# Patient Record
Sex: FEMALE | Race: WHITE | NOT HISPANIC OR LATINO | Employment: FULL TIME | URBAN - METROPOLITAN AREA
[De-identification: names, ages, dates, MRNs, and addresses within clinical notes are randomized per-mention and may not be internally consistent; named-entity substitution may affect disease eponyms.]

---

## 2022-09-15 ENCOUNTER — TELEPHONE (OUTPATIENT)
Dept: FAMILY MEDICINE CLINIC | Facility: CLINIC | Age: 57
End: 2022-09-15

## 2022-09-15 ENCOUNTER — OFFICE VISIT (OUTPATIENT)
Dept: FAMILY MEDICINE CLINIC | Facility: CLINIC | Age: 57
End: 2022-09-15
Payer: COMMERCIAL

## 2022-09-15 VITALS
WEIGHT: 154 LBS | OXYGEN SATURATION: 96 % | HEART RATE: 92 BPM | TEMPERATURE: 98.3 F | BODY MASS INDEX: 30.23 KG/M2 | DIASTOLIC BLOOD PRESSURE: 72 MMHG | SYSTOLIC BLOOD PRESSURE: 112 MMHG | RESPIRATION RATE: 16 BRPM | HEIGHT: 60 IN

## 2022-09-15 DIAGNOSIS — Z13.6 SCREENING FOR HYPERTENSION: ICD-10-CM

## 2022-09-15 DIAGNOSIS — Z13.220 SCREENING FOR HYPERLIPIDEMIA: ICD-10-CM

## 2022-09-15 DIAGNOSIS — Z13.29 SCREENING FOR HYPOTHYROIDISM: ICD-10-CM

## 2022-09-15 DIAGNOSIS — Z11.4 SCREENING FOR HIV (HUMAN IMMUNODEFICIENCY VIRUS): ICD-10-CM

## 2022-09-15 DIAGNOSIS — Z76.89 ESTABLISHING CARE WITH NEW DOCTOR, ENCOUNTER FOR: Primary | ICD-10-CM

## 2022-09-15 DIAGNOSIS — R05.9 COUGH: Primary | ICD-10-CM

## 2022-09-15 DIAGNOSIS — R05.9 COUGH: ICD-10-CM

## 2022-09-15 DIAGNOSIS — Z11.59 ENCOUNTER FOR HEPATITIS C SCREENING TEST FOR LOW RISK PATIENT: ICD-10-CM

## 2022-09-15 PROCEDURE — 3725F SCREEN DEPRESSION PERFORMED: CPT | Performed by: FAMILY MEDICINE

## 2022-09-15 PROCEDURE — 99204 OFFICE O/P NEW MOD 45 MIN: CPT | Performed by: FAMILY MEDICINE

## 2022-09-15 RX ORDER — ROSUVASTATIN CALCIUM 20 MG/1
20 TABLET, COATED ORAL DAILY
COMMUNITY

## 2022-09-15 RX ORDER — DOXYCYCLINE HYCLATE 100 MG
100 TABLET ORAL 2 TIMES DAILY
Qty: 28 TABLET | Refills: 0 | Status: SHIPPED | OUTPATIENT
Start: 2022-09-15 | End: 2022-09-29

## 2022-09-15 RX ORDER — FLUCONAZOLE 150 MG/1
TABLET ORAL
Qty: 2 TABLET | Refills: 3 | Status: SHIPPED | OUTPATIENT
Start: 2022-09-15 | End: 2022-09-18

## 2022-09-15 RX ORDER — BENZONATATE 200 MG/1
200 CAPSULE ORAL 3 TIMES DAILY PRN
Qty: 20 CAPSULE | Refills: 0 | Status: SHIPPED | OUTPATIENT
Start: 2022-09-15 | End: 2022-09-23 | Stop reason: SDUPTHER

## 2022-09-16 LAB
ALBUMIN SERPL-MCNC: 4.8 G/DL (ref 3.6–5.1)
ALBUMIN/GLOB SERPL: 1.7 (CALC) (ref 1–2.5)
ALP SERPL-CCNC: 76 U/L (ref 37–153)
ALT SERPL-CCNC: 20 U/L (ref 6–29)
AST SERPL-CCNC: 17 U/L (ref 10–35)
BASOPHILS # BLD AUTO: 67 CELLS/UL (ref 0–200)
BASOPHILS NFR BLD AUTO: 0.7 %
BILIRUB SERPL-MCNC: 0.7 MG/DL (ref 0.2–1.2)
BUN SERPL-MCNC: 19 MG/DL (ref 7–25)
BUN/CREAT SERPL: NORMAL (CALC) (ref 6–22)
CALCIUM SERPL-MCNC: 10.4 MG/DL (ref 8.6–10.4)
CHLORIDE SERPL-SCNC: 100 MMOL/L (ref 98–110)
CHOLEST SERPL-MCNC: 238 MG/DL
CHOLEST/HDLC SERPL: 4.7 (CALC)
CO2 SERPL-SCNC: 28 MMOL/L (ref 20–32)
CREAT SERPL-MCNC: 0.71 MG/DL (ref 0.5–1.03)
EOSINOPHIL # BLD AUTO: 228 CELLS/UL (ref 15–500)
EOSINOPHIL NFR BLD AUTO: 2.4 %
ERYTHROCYTE [DISTWIDTH] IN BLOOD BY AUTOMATED COUNT: 12.8 % (ref 11–15)
GFR/BSA.PRED SERPLBLD CYS-BASED-ARV: 100 ML/MIN/1.73M2
GLOBULIN SER CALC-MCNC: 2.8 G/DL (CALC) (ref 1.9–3.7)
GLUCOSE SERPL-MCNC: 86 MG/DL (ref 65–99)
HCT VFR BLD AUTO: 42.4 % (ref 35–45)
HCV AB S/CO SERPL IA: 0.04
HCV AB SERPL QL IA: NORMAL
HDLC SERPL-MCNC: 51 MG/DL
HGB BLD-MCNC: 14 G/DL (ref 11.7–15.5)
HIV 1+2 AB+HIV1 P24 AG SERPL QL IA: NORMAL
LDLC SERPL CALC-MCNC: 159 MG/DL (CALC)
LYMPHOCYTES # BLD AUTO: 2309 CELLS/UL (ref 850–3900)
LYMPHOCYTES NFR BLD AUTO: 24.3 %
MCH RBC QN AUTO: 30.1 PG (ref 27–33)
MCHC RBC AUTO-ENTMCNC: 33 G/DL (ref 32–36)
MCV RBC AUTO: 91.2 FL (ref 80–100)
MONOCYTES # BLD AUTO: 561 CELLS/UL (ref 200–950)
MONOCYTES NFR BLD AUTO: 5.9 %
NEUTROPHILS # BLD AUTO: 6337 CELLS/UL (ref 1500–7800)
NEUTROPHILS NFR BLD AUTO: 66.7 %
NONHDLC SERPL-MCNC: 187 MG/DL (CALC)
PLATELET # BLD AUTO: 400 THOUSAND/UL (ref 140–400)
PMV BLD REES-ECKER: 10.8 FL (ref 7.5–12.5)
POTASSIUM SERPL-SCNC: 4.1 MMOL/L (ref 3.5–5.3)
PROT SERPL-MCNC: 7.6 G/DL (ref 6.1–8.1)
RBC # BLD AUTO: 4.65 MILLION/UL (ref 3.8–5.1)
SODIUM SERPL-SCNC: 139 MMOL/L (ref 135–146)
TRIGL SERPL-MCNC: 146 MG/DL
TSH SERPL-ACNC: 0.67 MIU/L
WBC # BLD AUTO: 9.5 THOUSAND/UL (ref 3.8–10.8)

## 2022-09-18 NOTE — PROGRESS NOTES
Name: Samara Dimas      : 1965      MRN: [id number]  Encounter Provider: Zbigniew Joshua MD  Encounter Date: 9/15/2022   Encounter department: [address] [place] [address]     1  Establishing care with new doctor, encounter for  -     CBC and differential  -     Comprehensive metabolic panel  -     TSH, 3rd generation  -     Lipid panel  -     Hepatitis C Ab W/Refl To HCV RNA, Qn, PCR  -     Human Immunodeficiency Virus 1/2 Antigen / Antibody ( Fourth Generation) with Reflex Testing    2  Screening for hypertension  -     Comprehensive metabolic panel    3  Screening for hyperlipidemia  -     Lipid panel    4  Screening for hypothyroidism  -     TSH, 3rd generation    5  Encounter for hepatitis C screening test for low risk patient  -     Hepatitis C Ab W/Refl To HCV RNA, Qn, PCR    6  Screening for HIV (human immunodeficiency virus)  -     Human Immunodeficiency Virus 1/2 Antigen / Antibody ( Fourth Generation) with Reflex Testing    7  Cough  -     doxycycline hyclate (VIBRA-TABS) 100 mg tablet; Take 1 tablet (100 mg total) by mouth 2 (two) times a day for 14 days  -     benzonatate (TESSALON) 200 MG capsule; Take 1 capsule (200 mg total) by mouth 3 (three) times a day as needed for cough    BMI Counseling: Body mass index is 30 58 kg/m²  The BMI is above normal  Nutrition recommendations include encouraging healthy choices of fruits and vegetables and moderation in carbohydrate intake  Exercise recommendations include exercising 3-5 times per week  No pharmacotherapy was ordered  Rationale for BMI follow-up plan is due to patient being overweight or obese  Depression Screening and Follow-up Plan: Patient was screened for depression during today's encounter  They screened negative with a PHQ-2 score of 0          Subjective      PATIENT IS A NEW PATIENT TO THE PRACTICE    REVIEWED PAST MEDICAL HISTORY  DISCUSSED PRACTICE POLICY    RECOMMEND BW AND CPX IN THE FUTURE    Review of Systems   Constitutional: Negative for chills, fatigue and fever  HENT: Negative for congestion, ear discharge, ear pain, mouth sores, postnasal drip, sore throat and trouble swallowing  Eyes: Negative for pain, discharge and visual disturbance  Respiratory: Negative for cough, shortness of breath and wheezing  Cardiovascular: Negative for chest pain, palpitations and leg swelling  Gastrointestinal: Negative for abdominal distention, abdominal pain, blood in stool, diarrhea and nausea  Endocrine: Negative for polydipsia, polyphagia and polyuria  Genitourinary: Negative for dysuria, frequency, hematuria and urgency  Musculoskeletal: Negative for arthralgias, gait problem and joint swelling  Skin: Negative for pallor and rash  Neurological: Negative for dizziness, syncope, speech difficulty, weakness, light-headedness, numbness and headaches  Hematological: Negative for adenopathy  Psychiatric/Behavioral: Negative for behavioral problems, confusion and sleep disturbance  The patient is not nervous/anxious  Current Outpatient Medications on File Prior to Visit   Medication Sig    rosuvastatin (CRESTOR) 20 MG tablet Take 20 mg by mouth daily       Objective     /72 (BP Location: Right arm, Patient Position: Sitting, Cuff Size: Adult)   Pulse 92   Temp 98 3 °F (36 8 °C) (Temporal)   Resp 16   Ht 4' 11 5" (1 511 m)   Wt 69 9 kg (154 lb)   SpO2 96%   BMI 30 58 kg/m²     Physical Exam  Constitutional:       Appearance: She is well-developed  HENT:      Head: Normocephalic and atraumatic  Eyes:      General:         Right eye: No discharge  Left eye: No discharge  Conjunctiva/sclera: Conjunctivae normal       Pupils: Pupils are equal, round, and reactive to light  Neck:      Thyroid: No thyromegaly  Cardiovascular:      Rate and Rhythm: Normal rate and regular rhythm  Heart sounds: Normal heart sounds  No murmur heard    Pulmonary: Effort: Pulmonary effort is normal  No respiratory distress  Breath sounds: Normal breath sounds  No wheezing or rales  Abdominal:      General: Bowel sounds are normal       Palpations: Abdomen is soft  Tenderness: There is no abdominal tenderness  Musculoskeletal:         General: No tenderness  Normal range of motion  Cervical back: Normal range of motion and neck supple  Lymphadenopathy:      Cervical: No cervical adenopathy  Skin:     General: Skin is warm and dry  Findings: No erythema or rash  Neurological:      Mental Status: She is alert and oriented to person, place, and time  Psychiatric:         Behavior: Behavior normal          Thought Content:  Thought content normal          Judgment: Judgment normal        Melania Sow MD

## 2022-09-19 ENCOUNTER — TELEPHONE (OUTPATIENT)
Dept: FAMILY MEDICINE CLINIC | Facility: CLINIC | Age: 57
End: 2022-09-19

## 2022-09-19 NOTE — TELEPHONE ENCOUNTER
When I spoke to the pt about her b/w results she asked about the PT order discussed at her initial ov  Please advise

## 2022-09-20 ENCOUNTER — TELEPHONE (OUTPATIENT)
Dept: FAMILY MEDICINE CLINIC | Facility: CLINIC | Age: 57
End: 2022-09-20

## 2022-09-20 NOTE — TELEPHONE ENCOUNTER
[facility] states Sofiya was in last week for a cough  She still has the cough and not any better  Sofiya has 9 or 10 doses left of the antibiotic and still has meds for the cough    [phone]

## 2022-09-22 NOTE — TELEPHONE ENCOUNTER
Zack states Sofiya's cough is not improving  Still has dry coughing fits that exhauste her  Syeda has 1 more week of her antibiotic  Zack asked if maybe an inhaler would help?   [employer]  [phone]

## 2022-09-23 ENCOUNTER — OFFICE VISIT (OUTPATIENT)
Dept: FAMILY MEDICINE CLINIC | Facility: CLINIC | Age: 57
End: 2022-09-23
Payer: COMMERCIAL

## 2022-09-23 VITALS
BODY MASS INDEX: 30.43 KG/M2 | HEIGHT: 60 IN | OXYGEN SATURATION: 97 % | DIASTOLIC BLOOD PRESSURE: 70 MMHG | RESPIRATION RATE: 12 BRPM | HEART RATE: 83 BPM | TEMPERATURE: 98.2 F | WEIGHT: 155 LBS | SYSTOLIC BLOOD PRESSURE: 108 MMHG

## 2022-09-23 DIAGNOSIS — J98.01 BRONCHOSPASM: ICD-10-CM

## 2022-09-23 DIAGNOSIS — M79.673 PAIN OF FOOT, UNSPECIFIED LATERALITY: ICD-10-CM

## 2022-09-23 DIAGNOSIS — R05.9 COUGH: Primary | ICD-10-CM

## 2022-09-23 DIAGNOSIS — J40 BRONCHITIS: ICD-10-CM

## 2022-09-23 DIAGNOSIS — M54.2 NECK PAIN: ICD-10-CM

## 2022-09-23 PROCEDURE — 99213 OFFICE O/P EST LOW 20 MIN: CPT | Performed by: FAMILY MEDICINE

## 2022-09-23 RX ORDER — BENZONATATE 200 MG/1
200 CAPSULE ORAL 3 TIMES DAILY PRN
Qty: 20 CAPSULE | Refills: 0 | Status: SHIPPED | OUTPATIENT
Start: 2022-09-23

## 2022-09-23 RX ORDER — PREDNISONE 20 MG/1
TABLET ORAL
Qty: 14 TABLET | Refills: 0 | Status: SHIPPED | OUTPATIENT
Start: 2022-09-23

## 2022-09-23 RX ORDER — ALBUTEROL SULFATE 90 UG/1
2 AEROSOL, METERED RESPIRATORY (INHALATION) EVERY 6 HOURS PRN
Qty: 18 G | Refills: 5 | Status: SHIPPED | OUTPATIENT
Start: 2022-09-23

## 2022-09-23 NOTE — PROGRESS NOTES
Name: Jimbo Alejandre      : 1965      MRN: [id number]  Encounter Provider: Rosalva Ramírez MD  Encounter Date: 2022   Encounter department: [address] [facility]     1  Cough  -     predniSONE 20 mg tablet; 2 PO QD X 4 DAYS, THEN 1 PO QD X 4 DAYS, THEN 1/2 PO QD X 4 DAYS  -     albuterol (Ventolin HFA) 90 mcg/act inhaler; Inhale 2 puffs every 6 (six) hours as needed for wheezing  -     benzonatate (TESSALON) 200 MG capsule; Take 1 capsule (200 mg total) by mouth 3 (three) times a day as needed for cough    2  Bronchitis    3  Bronchospasm         Subjective      FOLLOW UP    CONTINUES TO COUGH  SL CONGESTION  STARTING TO FEEL A LITTLE BETTER  NO FEVER  COUGHING A LOT AT NIGHT    NO NVD      Review of Systems   Constitutional: Negative for chills, fatigue and fever  HENT: Positive for congestion and postnasal drip  Negative for ear discharge, ear pain, sinus pressure, sinus pain, sneezing, sore throat and trouble swallowing  Eyes: Negative for discharge  Respiratory: Positive for cough  Negative for chest tightness, shortness of breath and wheezing  Cardiovascular: Negative for chest pain  Gastrointestinal: Negative for abdominal pain, diarrhea, nausea and vomiting  Genitourinary: Negative for dysuria  Musculoskeletal: Negative for arthralgias, gait problem, myalgias and neck stiffness  Skin: Negative for rash  Neurological: Negative for headaches  Hematological: Negative for adenopathy  Psychiatric/Behavioral: The patient is not nervous/anxious          Current Outpatient Medications on File Prior to Visit   Medication Sig    doxycycline hyclate (VIBRA-TABS) 100 mg tablet Take 1 tablet (100 mg total) by mouth 2 (two) times a day for 14 days    rosuvastatin (CRESTOR) 20 MG tablet Take 20 mg by mouth daily    [DISCONTINUED] benzonatate (TESSALON) 200 MG capsule Take 1 capsule (200 mg total) by mouth 3 (three) times a day as needed for cough       Objective     /70   Pulse 83   Temp 98 2 °F (36 8 °C) (Temporal)   Resp 12   Ht 4' 11 5" (1 511 m)   Wt 70 3 kg (155 lb)   SpO2 97%   BMI 30 78 kg/m²     Physical Exam  Constitutional:       Appearance: She is well-developed  HENT:      Head: Normocephalic and atraumatic  Eyes:      General:         Right eye: No discharge  Left eye: No discharge  Conjunctiva/sclera: Conjunctivae normal       Pupils: Pupils are equal, round, and reactive to light  Neck:      Thyroid: No thyromegaly  Cardiovascular:      Rate and Rhythm: Normal rate and regular rhythm  Heart sounds: Normal heart sounds  No murmur heard  Pulmonary:      Effort: Pulmonary effort is normal  No respiratory distress  Breath sounds: No wheezing or rales  Comments: COARSE BS  SOME SCATTERED RHONCHI  PROLONGED EXP PHASE WITH SOME EXP WHEEZING  Abdominal:      General: Bowel sounds are normal       Palpations: Abdomen is soft  Tenderness: There is no abdominal tenderness  Musculoskeletal:         General: No tenderness  Normal range of motion  Cervical back: Normal range of motion and neck supple  Lymphadenopathy:      Cervical: No cervical adenopathy  Skin:     General: Skin is warm and dry  Findings: No erythema or rash  Neurological:      Mental Status: She is alert and oriented to person, place, and time  Psychiatric:         Behavior: Behavior normal          Thought Content:  Thought content normal          Judgment: Judgment normal        Altamese Magi MD

## 2022-09-23 NOTE — PATIENT INSTRUCTIONS
PLENTY OF FLUIDS  COMPLETE COURSE OF DOXYCYCLINE    PREDNISONE AND ALBUTEROL    CALL ON Monday WITH UPDATE - MAY NEED TO CONSIDER CHEST X RAY    FURTHER PLANS PENDING RESPONSE TO [address] [facility]

## 2022-09-27 ENCOUNTER — TELEPHONE (OUTPATIENT)
Dept: FAMILY MEDICINE CLINIC | Facility: CLINIC | Age: 57
End: 2022-09-27

## 2022-09-27 DIAGNOSIS — J40 BRONCHITIS: ICD-10-CM

## 2022-09-27 DIAGNOSIS — R05.9 COUGH: Primary | ICD-10-CM

## 2022-09-27 DIAGNOSIS — J98.01 BRONCHOSPASM: ICD-10-CM

## 2022-09-27 NOTE — TELEPHONE ENCOUNTER
Zack states you told them to call today if Sofiya is still coughing  Zack states the cough is reduced, but still having coughing attacks  Also Zack states Sofiya has also been complaining of pain in her back so not sure if that is her lungs  Zack asked if you could order a cxr      [facility] [phone]

## 2022-09-27 NOTE — TELEPHONE ENCOUNTER
A PER OUR DISCUSSION Friday I HAVE ORDERED THE X RAY    WILL CALL HER WHEN I GET THE RESULTS    THANKS

## 2022-11-09 ENCOUNTER — RA CDI HCC (OUTPATIENT)
Dept: OTHER | Facility: HOSPITAL | Age: 57
End: 2022-11-09

## 2022-11-09 NOTE — PROGRESS NOTES
[facility]  coding opportunities       Chart reviewed, no opportunity found: CHART REVIEWED, NO OPPORTUNITY FOUND        Patients Insurance        Commercial Insurance: [employer]

## 2022-11-14 ENCOUNTER — OFFICE VISIT (OUTPATIENT)
Dept: FAMILY MEDICINE CLINIC | Facility: CLINIC | Age: 57
End: 2022-11-14

## 2022-11-14 ENCOUNTER — TELEPHONE (OUTPATIENT)
Dept: FAMILY MEDICINE CLINIC | Facility: CLINIC | Age: 57
End: 2022-11-14

## 2022-11-14 ENCOUNTER — TELEPHONE (OUTPATIENT)
Dept: ADMINISTRATIVE | Facility: OTHER | Age: 57
End: 2022-11-14

## 2022-11-14 VITALS
DIASTOLIC BLOOD PRESSURE: 70 MMHG | WEIGHT: 153 LBS | TEMPERATURE: 98.5 F | SYSTOLIC BLOOD PRESSURE: 110 MMHG | HEART RATE: 87 BPM | HEIGHT: 60 IN | BODY MASS INDEX: 30.04 KG/M2 | OXYGEN SATURATION: 98 % | RESPIRATION RATE: 12 BRPM

## 2022-11-14 DIAGNOSIS — H10.13 ALLERGIC CONJUNCTIVITIS OF BOTH EYES: ICD-10-CM

## 2022-11-14 DIAGNOSIS — Z13.820 SCREENING FOR OSTEOPOROSIS: ICD-10-CM

## 2022-11-14 DIAGNOSIS — Z78.0 ASYMPTOMATIC MENOPAUSE: ICD-10-CM

## 2022-11-14 DIAGNOSIS — E78.2 MIXED HYPERLIPIDEMIA: ICD-10-CM

## 2022-11-14 DIAGNOSIS — Z23 NEED FOR INFLUENZA VACCINATION: ICD-10-CM

## 2022-11-14 DIAGNOSIS — Z12.31 ENCOUNTER FOR SCREENING MAMMOGRAM FOR BREAST CANCER: ICD-10-CM

## 2022-11-14 DIAGNOSIS — J45.20 MILD INTERMITTENT ASTHMA WITHOUT COMPLICATION: ICD-10-CM

## 2022-11-14 DIAGNOSIS — Z00.00 ROUTINE GENERAL MEDICAL EXAMINATION AT A HEALTH CARE FACILITY: Primary | ICD-10-CM

## 2022-11-14 DIAGNOSIS — Z13.6 SCREENING FOR HYPERTENSION: ICD-10-CM

## 2022-11-14 RX ORDER — OLOPATADINE HYDROCHLORIDE 1 MG/ML
1 SOLUTION/ DROPS OPHTHALMIC 2 TIMES DAILY
Qty: 5 ML | Refills: 3 | Status: SHIPPED | OUTPATIENT
Start: 2022-11-14

## 2022-11-14 NOTE — LETTER
Procedure Request Form: Cervical Cancer Screening      Date Requested: 11/15/22  Patient: Nik Layer  Patient : 1965   Referring Provider: Fidel Patino MD        Date of Procedure ______________________________       The above patient has informed us that they have completed their   most recent Cervical Cancer Screening at your facility  Please complete   this form and attach all corresponding procedure reports/results  Comments __________________________________________________________  ____________________________________________________________________  ____________________________________________________________________  ____________________________________________________________________    Facility Completing Procedure _________________________________________    Form Completed By (print name) _______________________________________      Signature __________________________________________________________      These reports are needed for  compliance  Please fax this completed form and a copy of the procedure report to our office located at [place] [address] as soon as possible to [phone] attention Joyce Yin: Phone [phone]    We thank you for your assistance in treating our mutual patient     [facility] - [phone] F

## 2022-11-14 NOTE — LETTER
MAGAN ORO      Current Outpatient Medications:   •  albuterol (Ventolin HFA) 90 mcg/act inhaler, Inhale 2 puffs every 6 (six) hours as needed for wheezing, Disp: 18 g, Rfl: 5  •  benzonatate (TESSALON) 200 MG capsule, Take 1 capsule (200 mg total) by mouth 3 (three) times a day as needed for cough, Disp: 20 capsule, Rfl: 0  •  predniSONE 20 mg tablet, 2 PO QD X 4 DAYS, THEN 1 PO QD X 4 DAYS, THEN 1/2 PO QD X 4 DAYS, Disp: 14 tablet, Rfl: 0  •  rosuvastatin (CRESTOR) 20 MG tablet, Take 20 mg by mouth daily, Disp: , Rfl:       Recent Results (from the past 6048 hour(s))   CBC and differential    Collection Time: 09/15/22  3:07 PM   Result Value Ref Range    White Blood Cell Count 9 5 3 8 - 10 8 Thousand/uL    Red Blood Cell Count 4 65 3 80 - 5 10 Million/uL    Hemoglobin 14 0 11 7 - 15 5 g/dL    HCT 42 4 35 0 - 45 0 %    MCV 91 2 80 0 - 100 0 fL    MCH 30 1 27 0 - 33 0 pg    MCHC 33 0 32 0 - 36 0 g/dL    RDW 12 8 11 0 - 15 0 %    Platelet Count 212 014 - 400 Thousand/uL    SL AMB MPV 10 8 7 5 - 12 5 fL    Neutrophils (Absolute) 6,337 1,500 - 7,800 cells/uL    Lymphocytes (Absolute) 2,309 850 - 3,900 cells/uL    Monocytes (Absolute) 561 200 - 950 cells/uL    Eosinophils (Absolute) 228 15 - 500 cells/uL    Basophils ABS 67 0 - 200 cells/uL    Neutrophils 66 7 %    Lymphocytes 24 3 %    Monocytes 5 9 %    Eosinophils 2 4 %    Basophils PCT 0 7 %   Comprehensive metabolic panel    Collection Time: 09/15/22  3:07 PM   Result Value Ref Range    Glucose, Random 86 65 - 99 mg/dL    BUN 19 7 - 25 mg/dL    Creatinine 0 71 0 50 - 1 03 mg/dL    eGFR 100 > OR = 60 mL/min/1 73m2    SL AMB BUN/CREATININE RATIO NOT APPLICABLE 6 - 22 (calc)    Sodium 139 135 - 146 mmol/L    Potassium 4 1 3 5 - 5 3 mmol/L    Chloride 100 98 - 110 mmol/L    CO2 28 20 - 32 mmol/L    Calcium 10 4 8 6 - 10 4 mg/dL    Protein, Total 7 6 6 1 - 8 1 g/dL    Albumin 4 8 3 6 - 5 1 g/dL    Globulin 2 8 1 9 - 3 7 g/dL (calc)    Albumin/Globulin Ratio 1 7 1 0 - 2 5 (calc)    TOTAL BILIRUBIN 0 7 0 2 - 1 2 mg/dL    Alkaline Phosphatase 76 37 - 153 U/L    AST 17 10 - 35 U/L    ALT 20 6 - 29 U/L   TSH, 3rd generation    Collection Time: 09/15/22  3:07 PM   Result Value Ref Range    TSH 0 67 mIU/L   Lipid panel    Collection Time: 09/15/22  3:07 PM   Result Value Ref Range    Total Cholesterol 238 (H) <200 mg/dL    HDL 51 > OR = 50 mg/dL    Triglycerides 146 <150 mg/dL    LDL Calculated 159 (H) mg/dL (calc)    Chol HDLC Ratio 4 7 <5 0 (calc)    Non-HDL Cholesterol 187 (H) <130 mg/dL (calc)   Hepatitis C Ab W/Refl To HCV RNA, Qn, PCR    Collection Time: 09/15/22  3:07 PM   Result Value Ref Range    HEP C AB NON-REACTIVE NON-REACTIVE    Signal to Cut-Off 0 04 <1 00   Human Immunodeficiency Virus 1/2 Antigen / Antibody ( Fourth Generation) with Reflex Testing    Collection Time: 09/15/22  3:07 PM   Result Value Ref Range    HIV AG/AB, 4th Gen NON-REACTIVE NON-REACTIVE

## 2022-11-14 NOTE — LETTER
MAGAN ORO      Current Outpatient Medications:   •  albuterol (Ventolin HFA) 90 mcg/act inhaler, Inhale 2 puffs every 6 (six) hours as needed for wheezing, Disp: 18 g, Rfl: 5  •  benzonatate (TESSALON) 200 MG capsule, Take 1 capsule (200 mg total) by mouth 3 (three) times a day as needed for cough, Disp: 20 capsule, Rfl: 0  •  predniSONE 20 mg tablet, 2 PO QD X 4 DAYS, THEN 1 PO QD X 4 DAYS, THEN 1/2 PO QD X 4 DAYS, Disp: 14 tablet, Rfl: 0  •  rosuvastatin (CRESTOR) 20 MG tablet, Take 20 mg by mouth daily, Disp: , Rfl:       Recent Results (from the past 6048 hour(s))   CBC and differential    Collection Time: 09/15/22  3:07 PM   Result Value Ref Range    White Blood Cell Count 9 5 3 8 - 10 8 Thousand/uL    Red Blood Cell Count 4 65 3 80 - 5 10 Million/uL    Hemoglobin 14 0 11 7 - 15 5 g/dL    HCT 42 4 35 0 - 45 0 %    MCV 91 2 80 0 - 100 0 fL    MCH 30 1 27 0 - 33 0 pg    MCHC 33 0 32 0 - 36 0 g/dL    RDW 12 8 11 0 - 15 0 %    Platelet Count 779 789 - 400 Thousand/uL    SL AMB MPV 10 8 7 5 - 12 5 fL    Neutrophils (Absolute) 6,337 1,500 - 7,800 cells/uL    Lymphocytes (Absolute) 2,309 850 - 3,900 cells/uL    Monocytes (Absolute) 561 200 - 950 cells/uL    Eosinophils (Absolute) 228 15 - 500 cells/uL    Basophils ABS 67 0 - 200 cells/uL    Neutrophils 66 7 %    Lymphocytes 24 3 %    Monocytes 5 9 %    Eosinophils 2 4 %    Basophils PCT 0 7 %   Comprehensive metabolic panel    Collection Time: 09/15/22  3:07 PM   Result Value Ref Range    Glucose, Random 86 65 - 99 mg/dL    BUN 19 7 - 25 mg/dL    Creatinine 0 71 0 50 - 1 03 mg/dL    eGFR 100 > OR = 60 mL/min/1 73m2    SL AMB BUN/CREATININE RATIO NOT APPLICABLE 6 - 22 (calc)    Sodium 139 135 - 146 mmol/L    Potassium 4 1 3 5 - 5 3 mmol/L    Chloride 100 98 - 110 mmol/L    CO2 28 20 - 32 mmol/L    Calcium 10 4 8 6 - 10 4 mg/dL    Protein, Total 7 6 6 1 - 8 1 g/dL    Albumin 4 8 3 6 - 5 1 g/dL    Globulin 2 8 1 9 - 3 7 g/dL (calc)    Albumin/Globulin Ratio 1 7 1 0 - 2 5 (calc)    TOTAL BILIRUBIN 0 7 0 2 - 1 2 mg/dL    Alkaline Phosphatase 76 37 - 153 U/L    AST 17 10 - 35 U/L    ALT 20 6 - 29 U/L   TSH, 3rd generation    Collection Time: 09/15/22  3:07 PM   Result Value Ref Range    TSH 0 67 mIU/L   Lipid panel    Collection Time: 09/15/22  3:07 PM   Result Value Ref Range    Total Cholesterol 238 (H) <200 mg/dL    HDL 51 > OR = 50 mg/dL    Triglycerides 146 <150 mg/dL    LDL Calculated 159 (H) mg/dL (calc)    Chol HDLC Ratio 4 7 <5 0 (calc)    Non-HDL Cholesterol 187 (H) <130 mg/dL (calc)   Hepatitis C Ab W/Refl To HCV RNA, Qn, PCR    Collection Time: 09/15/22  3:07 PM   Result Value Ref Range    HEP C AB NON-REACTIVE NON-REACTIVE    Signal to Cut-Off 0 04 <1 00   Human Immunodeficiency Virus 1/2 Antigen / Antibody ( Fourth Generation) with Reflex Testing    Collection Time: 09/15/22  3:07 PM   Result Value Ref Range    HIV AG/AB, 4th Gen NON-REACTIVE NON-REACTIVE

## 2022-11-14 NOTE — PROGRESS NOTES
FAMILY PRACTICE HEALTH MAINTENANCE OFFICE VISIT  [facility] - [facility]    NAME: Sofiya Davis  AGE: 56 y o  SEX: female  : 1965     DATE: 2022    Assessment and Plan     Problem List Items Addressed This Visit    None     Visit Diagnoses     Routine general medical examination at a health care facility    -  Primary    Mild intermittent asthma without complication        Mixed hyperlipidemia        Encounter for screening mammogram for breast cancer        Relevant Orders    Mammo screening bilateral w cad    Screening for osteoporosis        Relevant Orders    DXA bone density spine hip and pelvis    Asymptomatic menopause        Relevant Orders    DXA bone density spine hip and pelvis    Need for influenza vaccination        Screening for hypertension        Relevant Orders    POCT ECG (Completed)    Allergic conjunctivitis of both eyes        Relevant Medications    olopatadine (PATANOL) 0 1 % ophthalmic solution               Return in about 6 months (around 2023) for Recheck chol  Chief Complaint     Chief Complaint   Patient presents with   • Annual Exam       History of Present Illness     [address] [place] [address] Rd RECORD  NO CONCERNS AT THIS TIME        Well Adult Physical   Patient here for a comprehensive physical exam       Diet and Physical Activity  Diet: well balanced diet  Weight concerns: Patient has class 1 obesity (BMI 30-34  9)  Exercise: occasionally      Depression Screen  PHQ-2/9 Depression Screening            General Health  Hearing: Normal:  bilateral  Vision: no vision problems and wears glasses and contacts  Dental: regular dental visits    Reproductive Health          The following portions of the patient's history were reviewed and updated as appropriate: allergies, current medications, past family history, past medical history, past social history, past surgical history and problem list     Review of Systems     Review of Systems   Constitutional: Negative for chills, fatigue and fever  HENT: Negative for congestion, ear discharge, ear pain, mouth sores, postnasal drip, sore throat and trouble swallowing  Eyes: Negative for pain, discharge and visual disturbance  Respiratory: Negative for cough, shortness of breath and wheezing  Cardiovascular: Negative for chest pain, palpitations and leg swelling  Gastrointestinal: Negative for abdominal distention, abdominal pain, blood in stool, diarrhea and nausea  Endocrine: Negative for polydipsia, polyphagia and polyuria  Genitourinary: Negative for dysuria, frequency, hematuria and urgency  Musculoskeletal: Negative for arthralgias, gait problem and joint swelling  Skin: Negative for pallor and rash  Neurological: Negative for dizziness, syncope, speech difficulty, weakness, light-headedness, numbness and headaches  Hematological: Negative for adenopathy  Psychiatric/Behavioral: Negative for behavioral problems, confusion and sleep disturbance  The patient is not nervous/anxious  Past Medical History     Past Medical History:   Diagnosis Date   • Hyperlipidemia        Past Surgical History     No past surgical history on file      Social History     Social History     Socioeconomic History   • Marital status: /Civil Union     Spouse name: None   • Number of children: None   • Years of education: None   • Highest education level: None   Occupational History   • None   Tobacco Use   • Smoking status: Never Smoker   • Smokeless tobacco: Never Used   Vaping Use   • Vaping Use: Never used   Substance and Sexual Activity   • Alcohol use: Yes     Comment: rare   • Drug use: Never   • Sexual activity: None   Other Topics Concern   • None   Social History Narrative   • None     Social Determinants of Health     Financial Resource Strain: Not on file   Food Insecurity: Not on file   Transportation Needs: Not on file   Physical Activity: Not on file   Stress: Not on file   Social Connections: Not on file   Intimate Partner Violence: Not on file   Housing Stability: Not on file       Family History     Family History   Problem Relation Age of Onset   • Heart disease Mother    • Hyperlipidemia Mother    • Alzheimer's disease Father    • Substance Abuse Brother    • Mental illness Neg Hx        Current Medications       Current Outpatient Medications:   •  albuterol (Ventolin HFA) 90 mcg/act inhaler, Inhale 2 puffs every 6 (six) hours as needed for wheezing, Disp: 18 g, Rfl: 5  •  olopatadine (PATANOL) 0 1 % ophthalmic solution, Administer 1 drop to both eyes 2 (two) times a day, Disp: 5 mL, Rfl: 3  •  rosuvastatin (CRESTOR) 20 MG tablet, Take 20 mg by mouth daily, Disp: , Rfl:      Allergies     Allergies   Allergen Reactions   • Advil [Ibuprofen] Shortness Of Breath and Throat Swelling       Objective     /70   Pulse 87   Temp 98 5 °F (36 9 °C) (Temporal)   Resp 12   Ht 4' 11 5" (1 511 m)   Wt 69 4 kg (153 lb)   SpO2 98%   BMI 30 39 kg/m²      Physical Exam  Vitals reviewed  Constitutional:       Appearance: Normal appearance  She is well-developed  HENT:      Head: Normocephalic and atraumatic  Right Ear: Tympanic membrane and external ear normal       Left Ear: Tympanic membrane and external ear normal       Nose: Nose normal    Eyes:      General:         Right eye: No discharge  Left eye: No discharge  Conjunctiva/sclera: Conjunctivae normal       Pupils: Pupils are equal, round, and reactive to light  Neck:      Thyroid: No thyromegaly  Cardiovascular:      Rate and Rhythm: Normal rate and regular rhythm  Heart sounds: Normal heart sounds  No murmur heard  Pulmonary:      Effort: Pulmonary effort is normal       Breath sounds: Normal breath sounds  No wheezing or rales  Abdominal:      General: Bowel sounds are normal  There is no distension  Palpations: Abdomen is soft  There is no mass  Tenderness: There is no abdominal tenderness  There is no guarding or rebound  Musculoskeletal:         General: No tenderness or deformity  Normal range of motion  Cervical back: Normal range of motion and neck supple  Lymphadenopathy:      Cervical: No cervical adenopathy  Skin:     General: Skin is warm and dry  Findings: No erythema or rash  Neurological:      General: No focal deficit present  Mental Status: She is alert and oriented to person, place, and time  Cranial Nerves: No cranial nerve deficit  Sensory: No sensory deficit  Motor: No weakness or abnormal muscle tone  Coordination: Coordination normal       Gait: Gait normal       Deep Tendon Reflexes: Reflexes are normal and symmetric  Reflexes normal    Psychiatric:         Mood and Affect: Mood normal          Behavior: Behavior normal          Thought Content:  Thought content normal          Judgment: Judgment normal            No exam data present    Health Maintenance     Health Maintenance   Topic Date Due   • Pneumococcal Vaccine: Pediatrics (0 to 5 Years) and At-Risk Patients (6 to 59 Years) (1 - PCV) Never done   • DTaP,Tdap,and Td Vaccines (1 - Tdap) Never done   • Cervical Cancer Screening  Never done   • Breast Cancer Screening: Mammogram  Never done   • Colorectal Cancer Screening  Never done   • COVID-19 Vaccine (4 - Booster for Pfizer series) 03/12/2022   • Influenza Vaccine (1) Never done   • Depression Screening  09/15/2023   • BMI: Followup Plan  09/18/2023   • BMI: Adult  11/14/2023   • Annual Physical  11/14/2023   • HIV Screening  Completed   • Hepatitis C Screening  Completed   • HIB Vaccine  Aged Out   • Hepatitis B Vaccine  Aged Out   • IPV Vaccine  Aged Out   • Hepatitis A Vaccine  Aged Out   • Meningococcal ACWY Vaccine  Aged Out   • HPV Vaccine  Aged Out     Immunization History   Administered Date(s) Administered   • COVID-19 PFIZER VACCINE 0 3 ML IM 03/08/2021, 03/27/2021, 11/12/2021 Le Owens MD  [facility]

## 2022-11-14 NOTE — TELEPHONE ENCOUNTER
Dr Carpenter Presumxu placed an order for Sofiya to have a DXA scan  Sofiya wanted to know if we could look through her records that were sent if she had one done  She does not know if she has had one  I checked her past records  I did not see the actual test done, just the order  Left message stating that if she wanted to get her scans done at [facility] to call Juana's number and she can help schedule  Mailed her the forms

## 2022-11-14 NOTE — TELEPHONE ENCOUNTER
----- Message from [address] [place] [address] sent at 11/14/2022  8:06 AM EST -----  Regarding: CARE GAP REQUST - PAP  11/14/22 8:06 AM    Hello, our patient attached above has had Pap Smear (HPV) aka Cervical Cancer Screening completed/performed  Please assist in updating the patient chart by making an External outreach to [facility] located in [address]  The date of service is 2022      Thank you,  Héctor Hunt, CMA  [address]

## 2022-11-15 NOTE — TELEPHONE ENCOUNTER
Upon review of the In Basket request and the patient's chart, initial outreach has been made via fax to facility  , please see Contacts section for details       Thank you  Mine Melo

## 2022-11-17 ENCOUNTER — OFFICE VISIT (OUTPATIENT)
Dept: FAMILY MEDICINE CLINIC | Facility: CLINIC | Age: 57
End: 2022-11-17

## 2022-11-17 VITALS
DIASTOLIC BLOOD PRESSURE: 60 MMHG | BODY MASS INDEX: 30.43 KG/M2 | HEART RATE: 109 BPM | SYSTOLIC BLOOD PRESSURE: 88 MMHG | WEIGHT: 155 LBS | HEIGHT: 60 IN | TEMPERATURE: 99.7 F | OXYGEN SATURATION: 96 % | RESPIRATION RATE: 12 BRPM

## 2022-11-17 DIAGNOSIS — J40 BRONCHITIS: Primary | ICD-10-CM

## 2022-11-17 RX ORDER — AZITHROMYCIN 250 MG/1
TABLET, FILM COATED ORAL
Qty: 6 TABLET | Refills: 0 | Status: SHIPPED | OUTPATIENT
Start: 2022-11-17 | End: 2022-11-21

## 2022-11-17 RX ORDER — PROMETHAZINE HYDROCHLORIDE AND CODEINE PHOSPHATE 6.25; 1 MG/5ML; MG/5ML
5 SYRUP ORAL EVERY 4 HOURS PRN
Qty: 240 ML | Refills: 0 | Status: SHIPPED | OUTPATIENT
Start: 2022-11-17

## 2022-11-17 RX ORDER — FLUCONAZOLE 150 MG/1
TABLET ORAL
Qty: 2 TABLET | Refills: 3 | Status: SHIPPED | OUTPATIENT
Start: 2022-11-17 | End: 2022-11-20

## 2022-11-17 RX ORDER — OSELTAMIVIR PHOSPHATE 75 MG/1
75 CAPSULE ORAL 2 TIMES DAILY
Qty: 10 CAPSULE | Refills: 0 | Status: SHIPPED | OUTPATIENT
Start: 2022-11-17 | End: 2022-11-22

## 2022-11-17 NOTE — PROGRESS NOTES
Name: Alina Lin      : 1965      MRN: [id number]  Encounter Provider: Claus Tipton MD  Encounter Date: 2022   Encounter department: [address] [place] [address]  Bronchitis  -     promethazine-codeine (PHENERGAN WITH CODEINE) 6 25-10 mg/5 mL syrup; Take 5 mL by mouth every 4 (four) hours as needed for cough  -     azithromycin (ZITHROMAX) 250 mg tablet; Take 2 tablets today then 1 tablet daily x 4 days  -     oseltamivir (TAMIFLU) 75 mg capsule; Take 1 capsule (75 mg total) by mouth 2 (two) times a day for 5 days  -     fluconazole (DIFLUCAN) 150 mg tablet; 1 TAB PO TODAY, 1 TAB PO 3 DAYS LATER  -     MISCELLANEOUS LAB TEST         Subjective      Cough  This is a new problem  The current episode started 1 to 4 weeks ago  The problem has been gradually worsening  The problem occurs constantly  The cough is non-productive  Associated symptoms include chills, a fever, headaches, myalgias, nasal congestion, postnasal drip, rhinorrhea, a sore throat and wheezing  Pertinent negatives include no chest pain, ear congestion, ear pain, heartburn, hemoptysis, rash, shortness of breath, sweats or weight loss  She has tried OTC cough suppressant for the symptoms  The treatment provided no relief  Her past medical history is significant for bronchitis  Review of Systems   Constitutional: Positive for chills and fever  Negative for weight loss  HENT: Positive for postnasal drip, rhinorrhea and sore throat  Negative for congestion and ear pain  Eyes: Negative for discharge  Respiratory: Positive for cough and wheezing  Negative for hemoptysis, chest tightness and shortness of breath  Cardiovascular: Negative for chest pain and palpitations  Gastrointestinal: Negative for abdominal pain, diarrhea, heartburn, nausea and vomiting  Musculoskeletal: Positive for myalgias  Negative for arthralgias, gout, joint swelling and stiffness     Skin: Negative for itching and rash  Neurological: Positive for headaches  Negative for tingling and numbness  Psychiatric/Behavioral: The patient is not nervous/anxious  Current Outpatient Medications on File Prior to Visit   Medication Sig   • albuterol (Ventolin HFA) 90 mcg/act inhaler Inhale 2 puffs every 6 (six) hours as needed for wheezing   • olopatadine (PATANOL) 0 1 % ophthalmic solution Administer 1 drop to both eyes 2 (two) times a day   • rosuvastatin (CRESTOR) 20 MG tablet Take 20 mg by mouth daily       Objective     BP (!) 88/60   Pulse (!) 109   Temp 99 7 °F (37 6 °C) (Temporal)   Resp 12   Ht 4' 11 5" (1 511 m)   Wt 70 3 kg (155 lb)   SpO2 96%   BMI 30 78 kg/m²     Physical Exam  Constitutional:       Appearance: She is well-developed and well-nourished  HENT:      Head: Normocephalic and atraumatic  Right Ear: Tympanic membrane normal       Left Ear: Tympanic membrane normal       Mouth/Throat:      Mouth: Mucous membranes are dry  Eyes:      General:         Right eye: No discharge  Left eye: No discharge  Pupils: Pupils are equal, round, and reactive to light  Comments: CONJUNCTIVA RED     Cardiovascular:      Rate and Rhythm: Normal rate and regular rhythm  Heart sounds: Normal heart sounds  No murmur heard  Pulmonary:      Effort: Pulmonary effort is normal  No respiratory distress  Breath sounds: Wheezing and rhonchi present  No rales  Abdominal:      General: Bowel sounds are normal       Palpations: Abdomen is soft  Tenderness: There is no abdominal tenderness  There is no rebound  Musculoskeletal:      Cervical back: Normal range of motion and neck supple  Lymphadenopathy:      Cervical: No cervical adenopathy  Skin:     General: Skin is warm and dry  Findings: No rash  Neurological:      General: No focal deficit present  Mental Status: She is alert and oriented to person, place, and time     Psychiatric:         Mood and Affect: Mood and affect normal          Behavior: Behavior normal          Thought Content:  Thought content normal          Judgment: Judgment normal        Mohit Pratt MD

## 2022-11-18 LAB
FLUAV RNA RESP QL NAA+PROBE: POSITIVE
FLUBV RNA RESP QL NAA+PROBE: NEGATIVE
SARS-COV-2 RNA RESP QL NAA+PROBE: NEGATIVE

## 2022-11-21 NOTE — TELEPHONE ENCOUNTER
Upon review of the In Basket request we have found as a result of outreach that patient did not have the requested item(s) completed  Any additional questions or concerns should be emailed to the Practice Liaisons via the appropriate education email address, please do not reply via In Basket      Thank you  Brennen Alegre

## 2022-12-12 DIAGNOSIS — E78.2 MIXED HYPERLIPIDEMIA: Primary | ICD-10-CM

## 2022-12-12 RX ORDER — ROSUVASTATIN CALCIUM 20 MG/1
20 TABLET, COATED ORAL DAILY
Qty: 90 TABLET | Refills: 3 | Status: SHIPPED | OUTPATIENT
Start: 2022-12-12

## 2022-12-12 NOTE — TELEPHONE ENCOUNTER
Requested medication(s) are due for refill today: No  Patient has already received a courtesy refill: Yes  Other reason request has been forwarded to provider: Megha Miles

## 2023-03-22 ENCOUNTER — OFFICE VISIT (OUTPATIENT)
Dept: FAMILY MEDICINE CLINIC | Facility: CLINIC | Age: 58
End: 2023-03-22

## 2023-03-22 ENCOUNTER — TELEPHONE (OUTPATIENT)
Dept: FAMILY MEDICINE CLINIC | Facility: CLINIC | Age: 58
End: 2023-03-22

## 2023-03-22 VITALS
HEIGHT: 60 IN | OXYGEN SATURATION: 99 % | BODY MASS INDEX: 30.63 KG/M2 | SYSTOLIC BLOOD PRESSURE: 100 MMHG | DIASTOLIC BLOOD PRESSURE: 68 MMHG | TEMPERATURE: 98.6 F | HEART RATE: 96 BPM | RESPIRATION RATE: 12 BRPM | WEIGHT: 156 LBS

## 2023-03-22 DIAGNOSIS — J40 BRONCHITIS: ICD-10-CM

## 2023-03-22 DIAGNOSIS — F07.81 POST CONCUSSIVE SYNDROME: ICD-10-CM

## 2023-03-22 DIAGNOSIS — R90.89 ABNORMAL BRAIN MRI: ICD-10-CM

## 2023-03-22 DIAGNOSIS — J98.01 BRONCHOSPASM: ICD-10-CM

## 2023-03-22 DIAGNOSIS — J01.40 ACUTE NON-RECURRENT PANSINUSITIS: Primary | ICD-10-CM

## 2023-03-22 DIAGNOSIS — R05.9 COUGH: ICD-10-CM

## 2023-03-22 RX ORDER — VALACYCLOVIR HYDROCHLORIDE 500 MG/1
TABLET, FILM COATED ORAL
COMMUNITY
Start: 2023-01-09

## 2023-03-22 RX ORDER — ALBUTEROL SULFATE 90 UG/1
2 AEROSOL, METERED RESPIRATORY (INHALATION) EVERY 6 HOURS PRN
Qty: 18 G | Refills: 5 | Status: SHIPPED | OUTPATIENT
Start: 2023-03-22

## 2023-03-22 RX ORDER — PREDNISONE 20 MG/1
20 TABLET ORAL 2 TIMES DAILY WITH MEALS
Qty: 8 TABLET | Refills: 0 | Status: SHIPPED | OUTPATIENT
Start: 2023-03-22 | End: 2023-03-26

## 2023-03-22 RX ORDER — AMOXICILLIN AND CLAVULANATE POTASSIUM 875; 125 MG/1; MG/1
1 TABLET, FILM COATED ORAL EVERY 12 HOURS SCHEDULED
Qty: 20 TABLET | Refills: 0 | Status: SHIPPED | OUTPATIENT
Start: 2023-03-22 | End: 2023-04-01

## 2023-03-22 RX ORDER — CYCLOBENZAPRINE HCL 5 MG
TABLET ORAL
COMMUNITY
Start: 2023-01-18

## 2023-03-22 RX ORDER — NEOMYCIN SULFATE, POLYMYXIN B SULFATE, AND DEXAMETHASONE 3.5; 10000; 1 MG/G; [USP'U]/G; MG/G
OINTMENT OPHTHALMIC
COMMUNITY
Start: 2022-12-21

## 2023-03-22 NOTE — TELEPHONE ENCOUNTER
Syedemory was in to see [facility] and we were scheduling her CPX  I told her she had an appt in May and she wanted to know what that appt was for  Please advise

## 2023-03-22 NOTE — PROGRESS NOTES
Assessment/Plan:    1  Acute non-recurrent pansinusitis  -     amoxicillin-clavulanate (AUGMENTIN) 875-125 mg per tablet; Take 1 tablet by mouth every 12 (twelve) hours for 10 days    2  Cough  -     albuterol (Ventolin HFA) 90 mcg/act inhaler; Inhale 2 puffs every 6 (six) hours as needed for wheezing  -     predniSONE 20 mg tablet; Take 1 tablet (20 mg total) by mouth 2 (two) times a day with meals for 4 days    3  Bronchitis  -     amoxicillin-clavulanate (AUGMENTIN) 875-125 mg per tablet; Take 1 tablet by mouth every 12 (twelve) hours for 10 days  -     predniSONE 20 mg tablet; Take 1 tablet (20 mg total) by mouth 2 (two) times a day with meals for 4 days    4  Bronchospasm  -     predniSONE 20 mg tablet; Take 1 tablet (20 mg total) by mouth 2 (two) times a day with meals for 4 days    5  Abnormal brain MRI  -     Ambulatory Referral to Neurology; Future    6  Post concussive syndrome  -     Ambulatory Referral to Neurology; Future            Return if symptoms worsen or fail to improve  Subjective:      Patient ID: Chirag Medrano is a 66 y o  female  Chief Complaint   Patient presents with   • Cold Like Symptoms     Pt here for headache, stuffy nose, green mucous and sinus headache  Cough  This is a new problem  The current episode started in the past 7 days  The problem has been unchanged  The problem occurs every few minutes  The cough is productive of sputum  Associated symptoms include ear congestion, nasal congestion and postnasal drip  Pertinent negatives include no chest pain, chills, ear pain, fever, headaches, myalgias, rash, rhinorrhea, sore throat, shortness of breath or wheezing  Nothing aggravates the symptoms  She has tried nothing for the symptoms  Her past medical history is significant for bronchitis         The following portions of the patient's history were reviewed and updated as appropriate: allergies, current medications, past family history, past medical history, past social history, past surgical history and problem list     Review of Systems   Constitutional: Negative for chills, diaphoresis, fatigue and fever  HENT: Positive for congestion and postnasal drip  Negative for ear discharge, ear pain, rhinorrhea, sinus pressure, sinus pain and sore throat  Eyes: Negative for pain and discharge  Respiratory: Positive for cough  Negative for chest tightness, shortness of breath and wheezing  Cardiovascular: Negative for chest pain  Gastrointestinal: Negative for diarrhea, nausea and vomiting  Musculoskeletal: Negative for myalgias  Skin: Negative for rash  Neurological: Negative for dizziness and headaches  Hematological: Negative for adenopathy  Current Outpatient Medications   Medication Sig Dispense Refill   • albuterol (Ventolin HFA) 90 mcg/act inhaler Inhale 2 puffs every 6 (six) hours as needed for wheezing 18 g 5   • amoxicillin-clavulanate (AUGMENTIN) 875-125 mg per tablet Take 1 tablet by mouth every 12 (twelve) hours for 10 days 20 tablet 0   • neomycin-polymyxin-dexamethasone (MAXITROL) 0 35%-10,000 units/g-0 1%      • olopatadine (PATANOL) 0 1 % ophthalmic solution Administer 1 drop to both eyes 2 (two) times a day 5 mL 3   • predniSONE 20 mg tablet Take 1 tablet (20 mg total) by mouth 2 (two) times a day with meals for 4 days 8 tablet 0   • rosuvastatin (CRESTOR) 20 MG tablet Take 1 tablet (20 mg total) by mouth daily 90 tablet 3   • valACYclovir (VALTREX) 500 mg tablet      • cyclobenzaprine (FLEXERIL) 5 mg tablet  (Patient not taking: Reported on 3/22/2023)     • promethazine-codeine (PHENERGAN WITH CODEINE) 6 25-10 mg/5 mL syrup Take 5 mL by mouth every 4 (four) hours as needed for cough (Patient not taking: Reported on 3/22/2023) 240 mL 0     No current facility-administered medications for this visit         Objective:    /68 (BP Location: Right arm, Patient Position: Sitting, Cuff Size: Large)   Pulse 96   Temp 98 6 °F (37 °C) (Temporal) Resp 12    4' 11 5" (1 511 m)   Wt 70 8 kg (156 lb)   SpO2 99%   BMI 30 98 kg/m²        Physical Exam  Vitals reviewed  Constitutional:       General: She is not in acute distress  Appearance: Normal appearance  She is well-developed  She is not diaphoretic  HENT:      Head: Normocephalic and atraumatic  Right Ear: Ear canal and external ear normal  No drainage, swelling or tenderness  No middle ear effusion  Tympanic membrane is erythematous  Left Ear: Tympanic membrane, ear canal and external ear normal  No drainage, swelling or tenderness  No middle ear effusion  Nose: Rhinorrhea present  No mucosal edema  Rhinorrhea is purulent  Left Turbinates: Swollen  Right Sinus: No maxillary sinus tenderness or frontal sinus tenderness  Left Sinus: No maxillary sinus tenderness or frontal sinus tenderness  Mouth/Throat:      Pharynx: Uvula midline  No oropharyngeal exudate or posterior oropharyngeal erythema  Eyes:      General:         Right eye: No discharge  Left eye: No discharge  Conjunctiva/sclera: Conjunctivae normal    Neck:      Thyroid: No thyromegaly  Cardiovascular:      Rate and Rhythm: Normal rate and regular rhythm  Heart sounds: Normal heart sounds  Pulmonary:      Effort: Pulmonary effort is normal       Breath sounds: Examination of the right-lower field reveals wheezing  Examination of the left-lower field reveals wheezing  Wheezing present  No decreased breath sounds, rhonchi or rales  Musculoskeletal:      Cervical back: Normal range of motion and neck supple  Lymphadenopathy:      Cervical: No cervical adenopathy  Skin:     General: Skin is warm and dry  Findings: No rash  Neurological:      Mental Status: She is alert and oriented to person, place, and time  Psychiatric:         Behavior: Behavior normal          Thought Content:  Thought content normal                 JOSHUA Drew

## 2023-03-23 ENCOUNTER — TELEPHONE (OUTPATIENT)
Dept: FAMILY MEDICINE CLINIC | Facility: CLINIC | Age: 58
End: 2023-03-23

## 2023-03-23 DIAGNOSIS — B37.31 YEAST VAGINITIS: Primary | ICD-10-CM

## 2023-03-23 RX ORDER — FLUCONAZOLE 150 MG/1
TABLET ORAL
Qty: 2 TABLET | Refills: 0 | Status: SHIPPED | OUTPATIENT
Start: 2023-03-23 | End: 2023-03-30

## 2023-03-23 NOTE — TELEPHONE ENCOUNTER
Wanted to know if you can send something in for her for a yeast infection,  She was given an antibiotic yesterday    [facility] in [place]

## 2023-03-23 NOTE — TELEPHONE ENCOUNTER
Spoke to Sofiya and relayed Dr Barrera's message regarding what the appt is for   No further action required  r

## 2023-05-01 LAB
ALBUMIN SERPL-MCNC: 4.6 G/DL (ref 3.8–4.9)
ALBUMIN/GLOB SERPL: 2.2 {RATIO} (ref 1.2–2.2)
ALP SERPL-CCNC: 70 IU/L (ref 44–121)
ALT SERPL-CCNC: 20 IU/L (ref 0–32)
AST SERPL-CCNC: 19 IU/L (ref 0–40)
BASOPHILS # BLD AUTO: 0.1 X10E3/UL (ref 0–0.2)
BASOPHILS NFR BLD AUTO: 1 %
BILIRUB SERPL-MCNC: 0.8 MG/DL (ref 0–1.2)
BUN SERPL-MCNC: 16 MG/DL (ref 6–24)
BUN/CREAT SERPL: 24 (ref 9–23)
CALCIUM SERPL-MCNC: 9.8 MG/DL (ref 8.7–10.2)
CHLORIDE SERPL-SCNC: 102 MMOL/L (ref 96–106)
CHOLEST SERPL-MCNC: 182 MG/DL (ref 100–199)
CO2 SERPL-SCNC: 23 MMOL/L (ref 20–29)
CREAT SERPL-MCNC: 0.67 MG/DL (ref 0.57–1)
EGFR: 103 ML/MIN/1.73
EOSINOPHIL # BLD AUTO: 0.2 X10E3/UL (ref 0–0.4)
EOSINOPHIL NFR BLD AUTO: 3 %
ERYTHROCYTE [DISTWIDTH] IN BLOOD BY AUTOMATED COUNT: 12.8 % (ref 11.7–15.4)
GLOBULIN SER-MCNC: 2.1 G/DL (ref 1.5–4.5)
GLUCOSE SERPL-MCNC: 92 MG/DL (ref 70–99)
HCT VFR BLD AUTO: 39.1 % (ref 34–46.6)
HDLC SERPL-MCNC: 47 MG/DL
HGB BLD-MCNC: 13.4 G/DL (ref 11.1–15.9)
IMM GRANULOCYTES # BLD: 0 X10E3/UL (ref 0–0.1)
IMM GRANULOCYTES NFR BLD: 0 %
LDLC SERPL CALC-MCNC: 118 MG/DL (ref 0–99)
LYMPHOCYTES # BLD AUTO: 2.1 X10E3/UL (ref 0.7–3.1)
LYMPHOCYTES NFR BLD AUTO: 32 %
MCH RBC QN AUTO: 30.6 PG (ref 26.6–33)
MCHC RBC AUTO-ENTMCNC: 34.3 G/DL (ref 31.5–35.7)
MCV RBC AUTO: 89 FL (ref 79–97)
MONOCYTES # BLD AUTO: 0.5 X10E3/UL (ref 0.1–0.9)
MONOCYTES NFR BLD AUTO: 7 %
NEUTROPHILS # BLD AUTO: 3.9 X10E3/UL (ref 1.4–7)
NEUTROPHILS NFR BLD AUTO: 57 %
PLATELET # BLD AUTO: 306 X10E3/UL (ref 150–450)
POTASSIUM SERPL-SCNC: 4.8 MMOL/L (ref 3.5–5.2)
PROT SERPL-MCNC: 6.7 G/DL (ref 6–8.5)
RBC # BLD AUTO: 4.38 X10E6/UL (ref 3.77–5.28)
SL AMB VLDL CHOLESTEROL CALC: 17 MG/DL (ref 5–40)
SODIUM SERPL-SCNC: 140 MMOL/L (ref 134–144)
TRIGL SERPL-MCNC: 92 MG/DL (ref 0–149)
WBC # BLD AUTO: 6.8 X10E3/UL (ref 3.4–10.8)

## 2023-05-12 RX ORDER — PROMETHAZINE HYDROCHLORIDE 25 MG/1
25 TABLET ORAL
COMMUNITY
End: 2023-05-15 | Stop reason: ALTCHOICE

## 2023-05-12 RX ORDER — OXYCODONE HYDROCHLORIDE AND ACETAMINOPHEN 5; 325 MG/1; MG/1
2 TABLET ORAL
COMMUNITY
End: 2023-05-15 | Stop reason: HOSPADM

## 2023-05-15 ENCOUNTER — OFFICE VISIT (OUTPATIENT)
Dept: FAMILY MEDICINE CLINIC | Facility: CLINIC | Age: 58
End: 2023-05-15

## 2023-05-15 VITALS
HEART RATE: 91 BPM | SYSTOLIC BLOOD PRESSURE: 102 MMHG | RESPIRATION RATE: 12 BRPM | TEMPERATURE: 97.9 F | WEIGHT: 157 LBS | HEIGHT: 60 IN | BODY MASS INDEX: 30.82 KG/M2 | OXYGEN SATURATION: 98 % | DIASTOLIC BLOOD PRESSURE: 72 MMHG

## 2023-05-15 DIAGNOSIS — E78.5 DYSLIPIDEMIA: Primary | ICD-10-CM

## 2023-05-15 DIAGNOSIS — Z01.818 PREOPERATIVE CLEARANCE: ICD-10-CM

## 2023-05-15 NOTE — PATIENT INSTRUCTIONS
CONTINUE CURRENT TREATMENT PLAN  MONITOR DIETARY SODIUM, CHOL INTAKE  ENCOURAGE PHYSICAL ACTIVITY      THERE IS NO OBVIOUS MEDICAL CONTRAINDICATION FOR SURGERY UNDER GENERAL ANESTHESIA      RV 6 M, SOONER PRN      Recent Results (from the past 672 hour(s))   CBC and differential    Collection Time: 05/01/23  7:50 AM   Result Value Ref Range    White Blood Cell Count 6 8 3 4 - 10 8 x10E3/uL    Red Blood Cell Count 4 38 3 77 - 5 28 x10E6/uL    Hemoglobin 13 4 11 1 - 15 9 g/dL    HCT 39 1 34 0 - 46 6 %    MCV 89 79 - 97 fL    MCH 30 6 26 6 - 33 0 pg    MCHC 34 3 31 5 - 35 7 g/dL    RDW 12 8 11 7 - 15 4 %    Platelet Count 141 344 - 450 x10E3/uL    Neutrophils 57 Not Estab  %    Lymphocytes 32 Not Estab  %    Monocytes 7 Not Estab  %    Eosinophils 3 Not Estab  %    Basophils PCT 1 Not Estab  %    Neutrophils (Absolute) 3 9 1 4 - 7 0 x10E3/uL    Lymphocytes (Absolute) 2 1 0 7 - 3 1 x10E3/uL    Monocytes (Absolute) 0 5 0 1 - 0 9 x10E3/uL    Eosinophils (Absolute) 0 2 0 0 - 0 4 x10E3/uL    Basophils ABS 0 1 0 0 - 0 2 x10E3/uL    Immature Granulocytes 0 Not Estab  %    Immature Granulocytes (Absolute) 0 0 0 0 - 0 1 x10E3/uL   Comprehensive metabolic panel    Collection Time: 05/01/23  7:50 AM   Result Value Ref Range    Glucose, Random 92 70 - 99 mg/dL    BUN 16 6 - 24 mg/dL    Creatinine 0 67 0 57 - 1 00 mg/dL    eGFR 103 >59 mL/min/1 73    SL AMB BUN/CREATININE RATIO 24 (H) 9 - 23    Sodium 140 134 - 144 mmol/L    Potassium 4 8 3 5 - 5 2 mmol/L    Chloride 102 96 - 106 mmol/L    CO2 23 20 - 29 mmol/L    CALCIUM 9 8 8 7 - 10 2 mg/dL    Protein, Total 6 7 6 0 - 8 5 g/dL    Albumin 4 6 3 8 - 4 9 g/dL    Globulin, Total 2 1 1 5 - 4 5 g/dL    Albumin/Globulin Ratio 2 2 1 2 - 2 2    TOTAL BILIRUBIN 0 8 0 0 - 1 2 mg/dL    Alk Phos Isoenzymes 70 44 - 121 IU/L    AST 19 0 - 40 IU/L    ALT 20 0 - 32 IU/L   Lipid panel    Collection Time: 05/01/23  7:50 AM   Result Value Ref Range    Cholesterol, Total 182 100 - 199 mg/dL Triglycerides 92 0 - 149 mg/dL    HDL 47 >39 mg/dL    VLDL Cholesterol Calculated 17 5 - 40 mg/dL    LDL Calculated 118 (H) 0 - 99 mg/dL

## 2023-05-15 NOTE — PROGRESS NOTES
Name: Adria Hampton      : 1965      MRN: [id number]  Encounter Provider: Sloane Hood MD  Encounter Date: 5/15/2023   Encounter department: [facility]     1  Dyslipidemia  -     POCT ECG    2  Preoperative clearance  -     POCT ECG    BMI Counseling: Body mass index is 31 18 kg/m²  The BMI is above normal  Nutrition recommendations include encouraging healthy choices of fruits and vegetables and moderation in carbohydrate intake  Exercise recommendations include exercising 3-5 times per week  No pharmacotherapy was ordered  Rationale for BMI follow-up plan is due to patient being overweight or obese  Subjective      PATIENT RETURNS TO DISCUSS LAB WORK AND CHOL MANAGEMENT    PATIENT ALSO REQUESTS PRE OPERATIVE CLEARANCE FOR ELECTIVE FACIAL SURGERY    PAST MEDICAL HISTORY WAD REVIEWED  ECG WAS OBTAINED    PATIENT FEELS WELL  NO CONCERNS  DENIES ANY CP, SOB, PALPITATIONS    THERE IS NO OBVIOUS MEDICAL CONTRAINDICATION FOR SURGERY UNDER GENERAL ANESTHESIA      Review of Systems   Constitutional: Negative for chills, fatigue and fever  HENT: Negative for congestion, ear discharge, ear pain, mouth sores, postnasal drip, sore throat and trouble swallowing  Eyes: Negative for pain, discharge and visual disturbance  Respiratory: Negative for cough, shortness of breath and wheezing  Cardiovascular: Negative for chest pain, palpitations and leg swelling  Gastrointestinal: Negative for abdominal distention, abdominal pain, blood in stool, diarrhea and nausea  Endocrine: Negative for polydipsia, polyphagia and polyuria  Genitourinary: Negative for dysuria, frequency, hematuria and urgency  Musculoskeletal: Negative for arthralgias, gait problem and joint swelling  Skin: Negative for pallor and rash  Neurological: Negative for dizziness, syncope, speech difficulty, weakness, light-headedness, numbness and headaches     Hematological: "Negative for adenopathy  Psychiatric/Behavioral: Negative for behavioral problems, confusion and sleep disturbance  The patient is not nervous/anxious  Current Outpatient Medications on File Prior to Visit   Medication Sig   • albuterol (Ventolin HFA) 90 mcg/act inhaler Inhale 2 puffs every 6 (six) hours as needed for wheezing   • neomycin-polymyxin-dexamethasone (MAXITROL) 0 35%-10,000 units/g-0 1%    • olopatadine (PATANOL) 0 1 % ophthalmic solution Administer 1 drop to both eyes 2 (two) times a day   • rosuvastatin (CRESTOR) 20 MG tablet Take 1 tablet (20 mg total) by mouth daily   • [DISCONTINUED] cyclobenzaprine (FLEXERIL) 5 mg tablet    • [DISCONTINUED] oxyCODONE-acetaminophen (PERCOCET) 5-325 mg per tablet Take 2 tablets by mouth   • [DISCONTINUED] promethazine (PHENERGAN) 25 mg tablet Take 25 mg by mouth   • [DISCONTINUED] promethazine-codeine (PHENERGAN WITH CODEINE) 6 25-10 mg/5 mL syrup Take 5 mL by mouth every 4 (four) hours as needed for cough   • [DISCONTINUED] valACYclovir (VALTREX) 500 mg tablet  (Patient not taking: Reported on 5/15/2023)       Objective     /72 (BP Location: Right arm, Patient Position: Sitting, Cuff Size: Large)   Pulse 91   Temp 97 9 °F (36 6 °C) (Temporal)   Resp 12   Ht 4' 11 5\" (1 511 m)   Wt 71 2 kg (157 lb)   SpO2 98%   BMI 31 18 kg/m²     Physical Exam  Constitutional:       General: She is not in acute distress  Appearance: Normal appearance  She is well-developed  She is not ill-appearing  HENT:      Head: Normocephalic and atraumatic  Eyes:      General:         Right eye: No discharge  Left eye: No discharge  Conjunctiva/sclera: Conjunctivae normal       Pupils: Pupils are equal, round, and reactive to light  Neck:      Thyroid: No thyromegaly  Cardiovascular:      Rate and Rhythm: Normal rate and regular rhythm  Heart sounds: Normal heart sounds  No murmur heard    Pulmonary:      Effort: Pulmonary effort is normal  No " respiratory distress  Breath sounds: Normal breath sounds  No wheezing or rales  Abdominal:      General: Bowel sounds are normal       Palpations: Abdomen is soft  Tenderness: There is no abdominal tenderness  Musculoskeletal:         General: No tenderness  Normal range of motion  Cervical back: Normal range of motion and neck supple  Lymphadenopathy:      Cervical: No cervical adenopathy  Skin:     General: Skin is warm and dry  Findings: No erythema or rash  Neurological:      General: No focal deficit present  Mental Status: She is alert and oriented to person, place, and time  Psychiatric:         Mood and Affect: Mood normal          Behavior: Behavior normal          Thought Content:  Thought content normal          Judgment: Judgment normal        Rajani Allan MD

## 2023-05-15 NOTE — LETTER
MAGAN ORO        Current Outpatient Medications:   •  albuterol (Ventolin HFA) 90 mcg/act inhaler, Inhale 2 puffs every 6 (six) hours as needed for wheezing, Disp: 18 g, Rfl: 5  •  neomycin-polymyxin-dexamethasone (MAXITROL) 0 35%-10,000 units/g-0 1%, , Disp: , Rfl:   •  olopatadine (PATANOL) 0 1 % ophthalmic solution, Administer 1 drop to both eyes 2 (two) times a day, Disp: 5 mL, Rfl: 3  •  rosuvastatin (CRESTOR) 20 MG tablet, Take 1 tablet (20 mg total) by mouth daily, Disp: 90 tablet, Rfl: 3      Recent Results (from the past 672 hour(s))   CBC and differential    Collection Time: 05/01/23  7:50 AM   Result Value Ref Range    White Blood Cell Count 6 8 3 4 - 10 8 x10E3/uL    Red Blood Cell Count 4 38 3 77 - 5 28 x10E6/uL    Hemoglobin 13 4 11 1 - 15 9 g/dL    HCT 39 1 34 0 - 46 6 %    MCV 89 79 - 97 fL    MCH 30 6 26 6 - 33 0 pg    MCHC 34 3 31 5 - 35 7 g/dL    RDW 12 8 11 7 - 15 4 %    Platelet Count 196 519 - 450 x10E3/uL    Neutrophils 57 Not Estab  %    Lymphocytes 32 Not Estab  %    Monocytes 7 Not Estab  %    Eosinophils 3 Not Estab  %    Basophils PCT 1 Not Estab  %    Neutrophils (Absolute) 3 9 1 4 - 7 0 x10E3/uL    Lymphocytes (Absolute) 2 1 0 7 - 3 1 x10E3/uL    Monocytes (Absolute) 0 5 0 1 - 0 9 x10E3/uL    Eosinophils (Absolute) 0 2 0 0 - 0 4 x10E3/uL    Basophils ABS 0 1 0 0 - 0 2 x10E3/uL    Immature Granulocytes 0 Not Estab  %    Immature Granulocytes (Absolute) 0 0 0 0 - 0 1 x10E3/uL   Comprehensive metabolic panel    Collection Time: 05/01/23  7:50 AM   Result Value Ref Range    Glucose, Random 92 70 - 99 mg/dL    BUN 16 6 - 24 mg/dL    Creatinine 0 67 0 57 - 1 00 mg/dL    eGFR 103 >59 mL/min/1 73    SL AMB BUN/CREATININE RATIO 24 (H) 9 - 23    Sodium 140 134 - 144 mmol/L    Potassium 4 8 3 5 - 5 2 mmol/L    Chloride 102 96 - 106 mmol/L    CO2 23 20 - 29 mmol/L    CALCIUM 9 8 8 7 - 10 2 mg/dL    Protein, Total 6 7 6 0 - 8 5 g/dL    Albumin 4 6 3 8 - 4 9 g/dL    Globulin, Total 2 1 1 5 - 4 5 g/dL    Albumin/Globulin Ratio 2 2 1 2 - 2 2    TOTAL BILIRUBIN 0 8 0 0 - 1 2 mg/dL    Alk Phos Isoenzymes 70 44 - 121 IU/L    AST 19 0 - 40 IU/L    ALT 20 0 - 32 IU/L   Lipid panel    Collection Time: 05/01/23  7:50 AM   Result Value Ref Range    Cholesterol, Total 182 100 - 199 mg/dL    Triglycerides 92 0 - 149 mg/dL    HDL 47 >39 mg/dL    VLDL Cholesterol Calculated 17 5 - 40 mg/dL    LDL Calculated 118 (H) 0 - 99 mg/dL

## 2023-10-23 DIAGNOSIS — E78.5 DYSLIPIDEMIA: Primary | ICD-10-CM

## 2023-10-23 DIAGNOSIS — Z13.29 SCREENING FOR HYPOTHYROIDISM: ICD-10-CM

## 2023-10-23 DIAGNOSIS — Z13.6 SCREENING FOR HYPERTENSION: ICD-10-CM

## 2023-10-23 DIAGNOSIS — Z00.00 ROUTINE GENERAL MEDICAL EXAMINATION AT A HEALTH CARE FACILITY: ICD-10-CM

## 2023-11-04 LAB
ALBUMIN SERPL-MCNC: 4.6 G/DL (ref 3.8–4.9)
ALBUMIN/GLOB SERPL: 2 {RATIO} (ref 1.2–2.2)
ALP SERPL-CCNC: 65 IU/L (ref 44–121)
ALT SERPL-CCNC: 17 IU/L (ref 0–32)
AST SERPL-CCNC: 17 IU/L (ref 0–40)
BASOPHILS # BLD AUTO: 0.1 X10E3/UL (ref 0–0.2)
BASOPHILS NFR BLD AUTO: 1 %
BILIRUB SERPL-MCNC: 0.8 MG/DL (ref 0–1.2)
BUN SERPL-MCNC: 15 MG/DL (ref 6–24)
BUN/CREAT SERPL: 23 (ref 9–23)
CALCIUM SERPL-MCNC: 9.8 MG/DL (ref 8.7–10.2)
CHLORIDE SERPL-SCNC: 103 MMOL/L (ref 96–106)
CHOLEST SERPL-MCNC: 164 MG/DL (ref 100–199)
CO2 SERPL-SCNC: 26 MMOL/L (ref 20–29)
CREAT SERPL-MCNC: 0.65 MG/DL (ref 0.57–1)
EGFR: 103 ML/MIN/1.73
EOSINOPHIL # BLD AUTO: 0.2 X10E3/UL (ref 0–0.4)
EOSINOPHIL NFR BLD AUTO: 3 %
ERYTHROCYTE [DISTWIDTH] IN BLOOD BY AUTOMATED COUNT: 13.1 % (ref 11.7–15.4)
GLOBULIN SER-MCNC: 2.3 G/DL (ref 1.5–4.5)
GLUCOSE SERPL-MCNC: 92 MG/DL (ref 70–99)
HCT VFR BLD AUTO: 40.7 % (ref 34–46.6)
HDLC SERPL-MCNC: 49 MG/DL
HGB BLD-MCNC: 13.6 G/DL (ref 11.1–15.9)
IMM GRANULOCYTES # BLD: 0 X10E3/UL (ref 0–0.1)
IMM GRANULOCYTES NFR BLD: 0 %
LDLC SERPL CALC-MCNC: 103 MG/DL (ref 0–99)
LYMPHOCYTES # BLD AUTO: 2.1 X10E3/UL (ref 0.7–3.1)
LYMPHOCYTES NFR BLD AUTO: 32 %
MCH RBC QN AUTO: 30.5 PG (ref 26.6–33)
MCHC RBC AUTO-ENTMCNC: 33.4 G/DL (ref 31.5–35.7)
MCV RBC AUTO: 91 FL (ref 79–97)
MONOCYTES # BLD AUTO: 0.5 X10E3/UL (ref 0.1–0.9)
MONOCYTES NFR BLD AUTO: 7 %
NEUTROPHILS # BLD AUTO: 3.8 X10E3/UL (ref 1.4–7)
NEUTROPHILS NFR BLD AUTO: 57 %
PLATELET # BLD AUTO: 306 X10E3/UL (ref 150–450)
POTASSIUM SERPL-SCNC: 4.3 MMOL/L (ref 3.5–5.2)
PROT SERPL-MCNC: 6.9 G/DL (ref 6–8.5)
RBC # BLD AUTO: 4.46 X10E6/UL (ref 3.77–5.28)
SL AMB VLDL CHOLESTEROL CALC: 12 MG/DL (ref 5–40)
SODIUM SERPL-SCNC: 142 MMOL/L (ref 134–144)
TRIGL SERPL-MCNC: 58 MG/DL (ref 0–149)
TSH SERPL DL<=0.005 MIU/L-ACNC: 1.63 UIU/ML (ref 0.45–4.5)
WBC # BLD AUTO: 6.6 X10E3/UL (ref 3.4–10.8)

## 2023-11-15 ENCOUNTER — OFFICE VISIT (OUTPATIENT)
Dept: FAMILY MEDICINE CLINIC | Facility: CLINIC | Age: 58
End: 2023-11-15
Payer: COMMERCIAL

## 2023-11-15 VITALS
RESPIRATION RATE: 20 BRPM | WEIGHT: 151 LBS | TEMPERATURE: 98 F | BODY MASS INDEX: 29.64 KG/M2 | HEIGHT: 60 IN | HEART RATE: 74 BPM | OXYGEN SATURATION: 97 % | SYSTOLIC BLOOD PRESSURE: 100 MMHG | DIASTOLIC BLOOD PRESSURE: 62 MMHG

## 2023-11-15 DIAGNOSIS — Z13.29 SCREENING FOR HYPOTHYROIDISM: ICD-10-CM

## 2023-11-15 DIAGNOSIS — Z00.00 ROUTINE GENERAL MEDICAL EXAMINATION AT A HEALTH CARE FACILITY: Primary | ICD-10-CM

## 2023-11-15 DIAGNOSIS — Z13.6 SCREENING FOR HYPERTENSION: ICD-10-CM

## 2023-11-15 DIAGNOSIS — Z13.820 SCREENING FOR OSTEOPOROSIS: ICD-10-CM

## 2023-11-15 DIAGNOSIS — Z78.0 ASYMPTOMATIC MENOPAUSE: ICD-10-CM

## 2023-11-15 DIAGNOSIS — E78.5 DYSLIPIDEMIA: ICD-10-CM

## 2023-11-15 PROCEDURE — 93000 ELECTROCARDIOGRAM COMPLETE: CPT | Performed by: FAMILY MEDICINE

## 2023-11-15 PROCEDURE — 99396 PREV VISIT EST AGE 40-64: CPT | Performed by: FAMILY MEDICINE

## 2023-11-15 NOTE — PATIENT INSTRUCTIONS
DISCUSSED HEALTH ISSUES  HEALTHY DIET AND EXERCISE  BW WILL BE REVIEWED  MAMMOGRAPHY   RECOMMEND CALCIUM 7312-0347 MG DAILY  VITAMIN D3  1000 IU DAILY  RV IN 1 YEAR FOR ANNUAL EXAM, SOONER IF NEEDED

## 2023-11-15 NOTE — PROGRESS NOTES
Depression Screening and Follow-up Plan: Patient was screened for depression during today's encounter. They screened negative with a PHQ-2 score of 0.    FAMILY PRACTICE HEALTH MAINTENANCE OFFICE VISIT  [facility] - [address] [facility]    NAME: Sofiya Davis  AGE: 66 y.o. SEX: female  : 1965     DATE: 11/15/2023    Assessment and Plan     Problem List Items Addressed This Visit        Other    Dyslipidemia    Relevant Orders    POCT ECG (Completed)   Other Visit Diagnoses     Routine general medical examination at a health care facility    -  Primary    Screening for hypertension        Relevant Orders    POCT ECG (Completed)    Screening for hypothyroidism        Asymptomatic menopause        Relevant Orders    DXA bone density spine hip and pelvis    Screening for osteoporosis        Relevant Orders    DXA bone density spine hip and pelvis               Return in about 6 months (around 5/15/2024) for Recheck CHOL. Chief Complaint     Chief Complaint   Patient presents with   • Physical Exam       History of Present Illness     [address] RECORD  NO CONCERNS AT THIS TIME            Well Adult Physical   Patient here for a comprehensive physical exam.      Diet and Physical Activity  Diet: well balanced diet  Weight concerns: Patient is overweight (BMI 25.0-29. 9)  Exercise: occasionally      Depression Screen  PHQ-2/9 Depression Screening    Little interest or pleasure in doing things: 0 - not at all  Feeling down, depressed, or hopeless: 0 - not at all  PHQ-2 Score: 0  PHQ-2 Interpretation: Negative depression screen          General Health  Hearing: Normal:  bilateral  Vision: no vision problems and wears glasses  Dental: regular dental visits    Reproductive Health          The following portions of the patient's history were reviewed and updated as appropriate: allergies, current medications, past family history, past medical history, past social history, past surgical history and problem list.    Review of Systems     Review of Systems   Constitutional:  Negative for chills, fatigue and fever. HENT:  Negative for congestion, ear discharge, ear pain, mouth sores, postnasal drip, sore throat and trouble swallowing. Eyes:  Negative for pain, discharge and visual disturbance. Respiratory:  Negative for cough, shortness of breath and wheezing. Cardiovascular:  Negative for chest pain, palpitations and leg swelling. Gastrointestinal:  Negative for abdominal distention, abdominal pain, blood in stool, diarrhea and nausea. Endocrine: Negative for polydipsia, polyphagia and polyuria. Genitourinary:  Negative for dysuria, frequency, hematuria and urgency. Musculoskeletal:  Negative for arthralgias, gait problem and joint swelling. Skin:  Negative for pallor and rash. Neurological:  Negative for dizziness, syncope, speech difficulty, weakness, light-headedness, numbness and headaches. Hematological:  Negative for adenopathy. Psychiatric/Behavioral:  Negative for behavioral problems, confusion and sleep disturbance. The patient is not nervous/anxious. Past Medical History     Past Medical History:   Diagnosis Date   • Hyperlipidemia        Past Surgical History     History reviewed. No pertinent surgical history.     Social History     Social History     Socioeconomic History   • Marital status: /Civil Union     Spouse name: None   • Number of children: None   • Years of education: None   • Highest education level: None   Occupational History   • None   Tobacco Use   • Smoking status: Never   • Smokeless tobacco: Never   Vaping Use   • Vaping Use: Never used   Substance and Sexual Activity   • Alcohol use: Yes     Comment: rare   • Drug use: Never   • Sexual activity: None   Other Topics Concern   • None   Social History Narrative   • None     Social Determinants of Health     Financial Resource Strain: Not on file   Food Insecurity: Not on file   Transportation Needs: Not on file   Physical Activity: Not on file   Stress: Not on file   Social Connections: Not on file   Intimate Partner Violence: Not on file   Housing Stability: Not on file       Family History     Family History   Problem Relation Age of Onset   • Heart disease Mother    • Hyperlipidemia Mother    • Alzheimer's disease Father    • Substance Abuse Brother    • Mental illness Neg Hx        Current Medications       Current Outpatient Medications:   •  albuterol (Ventolin HFA) 90 mcg/act inhaler, Inhale 2 puffs every 6 (six) hours as needed for wheezing, Disp: 18 g, Rfl: 5  •  neomycin-polymyxin-dexamethasone (MAXITROL) 0.35%-10,000 units/g-0.1%, , Disp: , Rfl:   •  olopatadine (PATANOL) 0.1 % ophthalmic solution, Administer 1 drop to both eyes 2 (two) times a day, Disp: 5 mL, Rfl: 3  •  rosuvastatin (CRESTOR) 20 MG tablet, Take 1 tablet (20 mg total) by mouth daily, Disp: 90 tablet, Rfl: 3     Allergies     Allergies   Allergen Reactions   • Advil [Ibuprofen] Shortness Of Breath and Throat Swelling   • Pollen Extract Itching       Objective     /62 (BP Location: Left arm, Patient Position: Sitting, Cuff Size: Standard)   Pulse 74   Temp 98 °F (36.7 °C) (Temporal)   Resp 20   Ht 5' (1.524 m)   Wt 68.5 kg (151 lb)   SpO2 97%   BMI 29.49 kg/m²      Physical Exam  Vitals reviewed. Constitutional:       General: She is not in acute distress. Appearance: Normal appearance. She is well-developed and normal weight. She is not ill-appearing. HENT:      Head: Normocephalic and atraumatic. Right Ear: Tympanic membrane and external ear normal.      Left Ear: Tympanic membrane and external ear normal.      Nose: Nose normal.      Mouth/Throat:      Mouth: Mucous membranes are moist.   Eyes:      General:         Right eye: No discharge. Left eye: No discharge.       Conjunctiva/sclera: Conjunctivae normal.      Pupils: Pupils are equal, round, and reactive to light. Neck:      Thyroid: No thyromegaly. Cardiovascular:      Rate and Rhythm: Normal rate and regular rhythm. Heart sounds: Normal heart sounds. No murmur heard. Pulmonary:      Effort: Pulmonary effort is normal.      Breath sounds: Normal breath sounds. No wheezing or rales. Abdominal:      General: Bowel sounds are normal. There is no distension. Palpations: Abdomen is soft. There is no mass. Tenderness: There is no abdominal tenderness. There is no guarding or rebound. Musculoskeletal:         General: No tenderness or deformity. Normal range of motion. Cervical back: Normal range of motion and neck supple. Lymphadenopathy:      Cervical: No cervical adenopathy. Skin:     General: Skin is warm and dry. Findings: No erythema or rash. Neurological:      General: No focal deficit present. Mental Status: She is alert and oriented to person, place, and time. Cranial Nerves: No cranial nerve deficit. Sensory: No sensory deficit. Motor: No weakness or abnormal muscle tone. Coordination: Coordination normal.      Gait: Gait normal.      Deep Tendon Reflexes: Reflexes are normal and symmetric. Reflexes normal.   Psychiatric:         Mood and Affect: Mood normal.         Behavior: Behavior normal.         Thought Content: Thought content normal.         Judgment: Judgment normal.           No results found.     Health Maintenance     Health Maintenance   Topic Date Due   • DTaP,Tdap,and Td Vaccines (1 - Tdap) Never done   • Cervical Cancer Screening  Never done   • Breast Cancer Screening: Mammogram  Never done   • Colorectal Cancer Screening  Never done   • COVID-19 Vaccine (4 - Pfizer series) 01/07/2022   • Influenza Vaccine (1) Never done   • BMI: Followup Plan  05/15/2024   • BMI: Adult  05/15/2024   • Depression Screening  11/15/2024   • Annual Physical  11/15/2024   • HIV Screening  Completed   • Hepatitis C Screening Completed   • Pneumococcal Vaccine: Pediatrics (0 to 5 Years) and At-Risk Patients (6 to 59 Years)  Aged Out   • HIB Vaccine  Aged Out   • IPV Vaccine  Aged Out   • Hepatitis A Vaccine  Aged Out   • Meningococcal ACWY Vaccine  Aged Out   • HPV Vaccine  Aged [employer] History   Administered Date(s) Administered   • COVID-19 PFIZER VACCINE 0.3 ML IM 03/08/2021, 03/27/2021, 11/12/2021       Gladys Merlos MD  [address] [place] [address]

## 2023-11-15 NOTE — LETTER
MAGAN      Current Outpatient Medications:     albuterol (Ventolin HFA) 90 mcg/act inhaler, Inhale 2 puffs every 6 (six) hours as needed for wheezing, Disp: 18 g, Rfl: 5    neomycin-polymyxin-dexamethasone (MAXITROL) 0.35%-10,000 units/g-0.1%, , Disp: , Rfl:     olopatadine (PATANOL) 0.1 % ophthalmic solution, Administer 1 drop to both eyes 2 (two) times a day, Disp: 5 mL, Rfl: 3    rosuvastatin (CRESTOR) 20 MG tablet, Take 1 tablet (20 mg total) by mouth daily, Disp: 90 tablet, Rfl: 3      Recent Results (from the past 672 hour(s))   Jovany Chin Default    Collection Time: 11/03/23  7:18 AM   Result Value Ref Range    White Blood Cell Count 6.6 3.4 - 10.8 x10E3/uL    Red Blood Cell Count 4.46 3.77 - 5.28 x10E6/uL    Hemoglobin 13.6 11.1 - 15.9 g/dL    HCT 40.7 34.0 - 46.6 %    MCV 91 79 - 97 fL    MCH 30.5 26.6 - 33.0 pg    MCHC 33.4 31.5 - 35.7 g/dL    RDW 13.1 11.7 - 15.4 %    Platelet Count 654 097 - 450 x10E3/uL    Neutrophils 57 Not Estab. %    Lymphocytes 32 Not Estab. %    Monocytes 7 Not Estab. %    Eosinophils 3 Not Estab. %    Basophils PCT 1 Not Estab. %    Neutrophils (Absolute) 3.8 1.4 - 7.0 x10E3/uL    Lymphocytes (Absolute) 2.1 0.7 - 3.1 x10E3/uL    Monocytes (Absolute) 0.5 0.1 - 0.9 x10E3/uL    Eosinophils (Absolute) 0.2 0.0 - 0.4 x10E3/uL    Basophils ABS 0.1 0.0 - 0.2 x10E3/uL    Immature Granulocytes 0 Not Estab. %    Immature Granulocytes (Absolute) 0.0 0.0 - 0.1 x10E3/uL   Comprehensive metabolic panel    Collection Time: 11/03/23  7:18 AM   Result Value Ref Range    Glucose, Random 92 70 - 99 mg/dL    BUN 15 6 - 24 mg/dL    Creatinine 0.65 0.57 - 1.00 mg/dL    eGFR 103 >59 mL/min/1.73    SL AMB BUN/CREATININE RATIO 23 9 - 23    Sodium 142 134 - 144 mmol/L    Potassium 4.3 3.5 - 5.2 mmol/L    Chloride 103 96 - 106 mmol/L    CO2 26 20 - 29 mmol/L    CALCIUM 9.8 8.7 - 10.2 mg/dL    Protein, Total 6.9 6.0 - 8.5 g/dL    Albumin 4.6 3.8 - 4.9 g/dL    Globulin, Total 2.3 1.5 - 4.5 g/dL Albumin/Globulin Ratio 2.0 1.2 - 2.2    TOTAL BILIRUBIN 0.8 0.0 - 1.2 mg/dL    Alk Phos Isoenzymes 65 44 - 121 IU/L    AST 17 0 - 40 IU/L    ALT 17 0 - 32 IU/L   Lipid panel    Collection Time: 11/03/23  7:18 AM   Result Value Ref Range    Cholesterol, Total 164 100 - 199 mg/dL    Triglycerides 58 0 - 149 mg/dL    HDL 49 >39 mg/dL    VLDL Cholesterol Calculated 12 5 - 40 mg/dL    LDL Calculated 103 (H) 0 - 99 mg/dL   TSH, 3rd generation    Collection Time: 11/03/23  7:18 AM   Result Value Ref Range    TSH 1.630 0.450 - 4.500 uIU/mL

## 2023-11-16 ENCOUNTER — TELEPHONE (OUTPATIENT)
Dept: FAMILY MEDICINE CLINIC | Facility: CLINIC | Age: 58
End: 2023-11-16

## 2023-11-16 NOTE — TELEPHONE ENCOUNTER
Sofiya asked for us to look into her old records from her previous doctor to see the date of her last bone density report. I looked at all her previous records. I only saw an order placed for bone density dated 11/07/2013. There was no record of the actual bone density report. I left Sofiya a detailed message with that information stating I had no record of a bone density report.   No further action required

## 2023-12-12 ENCOUNTER — OFFICE VISIT (OUTPATIENT)
Dept: FAMILY MEDICINE CLINIC | Facility: CLINIC | Age: 58
End: 2023-12-12
Payer: COMMERCIAL

## 2023-12-12 VITALS
DIASTOLIC BLOOD PRESSURE: 70 MMHG | WEIGHT: 150 LBS | BODY MASS INDEX: 29.45 KG/M2 | SYSTOLIC BLOOD PRESSURE: 108 MMHG | OXYGEN SATURATION: 96 % | HEART RATE: 94 BPM | HEIGHT: 60 IN | TEMPERATURE: 98.1 F | RESPIRATION RATE: 14 BRPM

## 2023-12-12 DIAGNOSIS — R05.1 ACUTE COUGH: ICD-10-CM

## 2023-12-12 DIAGNOSIS — R05.9 COUGH: ICD-10-CM

## 2023-12-12 DIAGNOSIS — U07.1 COVID: ICD-10-CM

## 2023-12-12 DIAGNOSIS — R52 BODY ACHES: Primary | ICD-10-CM

## 2023-12-12 LAB
SARS-COV-2 AG UPPER RESP QL IA: POSITIVE
VALID CONTROL: ABNORMAL

## 2023-12-12 PROCEDURE — 99213 OFFICE O/P EST LOW 20 MIN: CPT | Performed by: STUDENT IN AN ORGANIZED HEALTH CARE EDUCATION/TRAINING PROGRAM

## 2023-12-12 PROCEDURE — 87811 SARS-COV-2 COVID19 W/OPTIC: CPT | Performed by: STUDENT IN AN ORGANIZED HEALTH CARE EDUCATION/TRAINING PROGRAM

## 2023-12-12 RX ORDER — FLUTICASONE PROPIONATE 50 MCG
1 SPRAY, SUSPENSION (ML) NASAL DAILY
Qty: 9.9 ML | Refills: 1 | Status: SHIPPED | OUTPATIENT
Start: 2023-12-12

## 2023-12-12 RX ORDER — ALBUTEROL SULFATE 90 UG/1
2 AEROSOL, METERED RESPIRATORY (INHALATION) EVERY 6 HOURS PRN
Qty: 18 G | Refills: 5 | Status: SHIPPED | OUTPATIENT
Start: 2023-12-12

## 2023-12-12 RX ORDER — BENZONATATE 100 MG/1
100 CAPSULE ORAL 3 TIMES DAILY PRN
Qty: 20 CAPSULE | Refills: 0 | Status: SHIPPED | OUTPATIENT
Start: 2023-12-12

## 2023-12-12 RX ORDER — NIRMATRELVIR AND RITONAVIR 300-100 MG
3 KIT ORAL 2 TIMES DAILY
Qty: 30 TABLET | Refills: 0 | Status: SHIPPED | OUTPATIENT
Start: 2023-12-12 | End: 2023-12-17

## 2023-12-12 NOTE — PROGRESS NOTES
Name: Brittny Byers      : 1965      MRN: [id number]  Encounter Provider: Swathi Hoffmann MD  Encounter Date: 2023   Encounter department: [facility]     1. Body aches  -     POCT Rapid Covid Ag    2. COVID  -     nirmatrelvir & ritonavir (Paxlovid, 300/100,) tablet therapy pack; Take 3 tablets by mouth 2 (two) times a day for 5 days Take 2 nirmatrelvir tablets + 1 ritonavir tablet together per dose    3. Acute cough  -     benzonatate (TESSALON PERLES) 100 mg capsule; Take 1 capsule (100 mg total) by mouth 3 (three) times a day as needed for cough  -     fluticasone (FLONASE) 50 mcg/act nasal spray; 1 spray into each nostril daily    4. Cough  -     albuterol (Ventolin HFA) 90 mcg/act inhaler; Inhale 2 puffs every 6 (six) hours as needed for wheezing          Increase fluid intake as tolerated  Antiviral for full course  Flonase OTC 1-2 sprays each nostril daily PRN for postnasal drip  RTO in 1 week if symptoms persist or worsen    Subjective      HPI    Symptoms started four days ago. She has cough, nasal congestion and body aches. She is traveling to [facility] soon. Review of Systems   Constitutional:  Positive for activity change, appetite change, chills and fever. HENT:  Positive for congestion. Negative for postnasal drip. Respiratory:  Positive for cough. Negative for shortness of breath and wheezing. Cardiovascular:  Negative for chest pain, palpitations and leg swelling. Gastrointestinal:  Negative for abdominal pain, constipation, diarrhea, nausea and vomiting. Musculoskeletal:  Positive for myalgias. Neurological:  Negative for weakness, light-headedness and headaches. Psychiatric/Behavioral:  The patient is not nervous/anxious.         Current Outpatient Medications on File Prior to Visit   Medication Sig   • olopatadine (PATANOL) 0.1 % ophthalmic solution Administer 1 drop to both eyes 2 (two) times a day   • rosuvastatin (CRESTOR) 20 MG tablet Take 1 tablet (20 mg total) by mouth daily   • [DISCONTINUED] albuterol (Ventolin HFA) 90 mcg/act inhaler Inhale 2 puffs every 6 (six) hours as needed for wheezing   • neomycin-polymyxin-dexamethasone (MAXITROL) 0.35%-10,000 units/g-0.1%  (Patient not taking: Reported on 12/12/2023)       Objective     /70 (BP Location: Left arm, Patient Position: Sitting, Cuff Size: Large)   Pulse 94   Temp 98.1 °F (36.7 °C) (Temporal)   Resp 14   Ht 5' (1.524 m)   Wt 68 kg (150 lb)   SpO2 96%   BMI 29.29 kg/m²     Physical Exam  Constitutional:       Appearance: Normal appearance. HENT:      Head: Normocephalic and atraumatic. Right Ear: Tympanic membrane, ear canal and external ear normal.      Left Ear: Tympanic membrane, ear canal and external ear normal.      Nose: Congestion and rhinorrhea present. Mouth/Throat:      Pharynx: Posterior oropharyngeal erythema present. Cardiovascular:      Rate and Rhythm: Normal rate and regular rhythm. Pulses: Normal pulses. Heart sounds: Normal heart sounds. Pulmonary:      Effort: Pulmonary effort is normal.      Breath sounds: Normal breath sounds. Neurological:      General: No focal deficit present. Mental Status: She is alert and oriented to person, place, and time. Psychiatric:         Mood and Affect: Mood normal.         Behavior: Behavior normal.         Thought Content:  Thought content normal.         Judgment: Judgment normal.       Khadijah Brennan MD

## 2023-12-12 NOTE — LETTER
December 12, 2023     Patient: Letitia Calderon  YOB: 1965  Date of Visit: 12/12/2023      To Whom it May Concern:    Letitia Calderon is under my professional care. Sofiya was seen in my office on 12/12/2023. Sofiya may return to work on 12/15/23 . If you have any questions or concerns, please don't hesitate to call.          Sincerely,          Yasmani Campa MD        CC: No Recipients

## 2023-12-13 ENCOUNTER — TELEPHONE (OUTPATIENT)
Dept: OTHER | Facility: HOSPITAL | Age: 58
End: 2023-12-13

## 2023-12-13 NOTE — TELEPHONE ENCOUNTER
Patient called she has covid was prescribed paxlovid and now has diarrhea. Patient is wondering if there is something she can take or can be prescribed to help her with this. Please follow up with patient and if a medication is prescribed can it go to [facility] in [facility].

## 2023-12-13 NOTE — TELEPHONE ENCOUNTER
Patient called back to see if there was an update to her previous message. She said the diarrhea is starting to bother her and she would like to get some medicine for it asap.

## 2023-12-14 ENCOUNTER — TELEPHONE (OUTPATIENT)
Age: 58
End: 2023-12-14

## 2023-12-14 DIAGNOSIS — R19.7 DIARRHEA, UNSPECIFIED TYPE: Primary | ICD-10-CM

## 2023-12-14 RX ORDER — LOPERAMIDE HYDROCHLORIDE 2 MG/1
2 CAPSULE ORAL 4 TIMES DAILY PRN
Qty: 30 CAPSULE | Refills: 0 | Status: SHIPPED | OUTPATIENT
Start: 2023-12-14

## 2023-12-14 NOTE — TELEPHONE ENCOUNTER
Sent email to Billing and advised patient I will let her know the outcome. Tarah Concepcion MINDY  Practice Administrator  [address] [facility]

## 2023-12-14 NOTE — TELEPHONE ENCOUNTER
12-14-23    I sent an email to the Billing Department asking them to review the charges and let me know what the issue is. It looks like Dr. Corbin Roque coded it properly and only charged for the Physical.    I sent an inbasket message to the patient letting her the outcome. Tarah Concepcion [facility]  Practice Administrator  [address] [facility] THE

## 2023-12-14 NOTE — TELEPHONE ENCOUNTER
Pt's  called in stating a copay of $10 was requested, but he called their insurance company and they said it was supposed to be waived for a physical exam. He also said the Electrocardiogram was billed separately. If were billed together it would've been covered. He's requesting a call back at [phone].

## 2023-12-21 NOTE — TELEPHONE ENCOUNTER
Sent inbasket message to patient advising EKG is not part of the standard preventative exam.  Copay is applicable.    SAIB Jansen  Practice Administrator  [facility] [place] [place]

## 2023-12-21 NOTE — TELEPHONE ENCOUNTER
12-21-23    Response from Billing:    It looks like an ECG was performed during the visit as well.  Per the insurance EOB there is a $10.00 copay for the ECG as the insurance provider I am aware of don't consider an ECG part of a standard Preventative visit.    Best Regards,    Keith Mojica  Patient Billing &   Patient Revenue Services - Admin Team  [facility]  (P) [phone]  (F) [phone]

## 2023-12-22 ENCOUNTER — TELEPHONE (OUTPATIENT)
Age: 58
End: 2023-12-22

## 2023-12-22 DIAGNOSIS — R05.1 ACUTE COUGH: Primary | ICD-10-CM

## 2023-12-22 RX ORDER — AZITHROMYCIN 250 MG/1
TABLET, FILM COATED ORAL
Qty: 6 TABLET | Refills: 0 | Status: SHIPPED | OUTPATIENT
Start: 2023-12-22 | End: 2023-12-26

## 2023-12-22 NOTE — TELEPHONE ENCOUNTER
Pt saw Dr. Bruce for covid. Pt said she is going [place] tomorrow and the doctor told her she would send an abx for her to take along in case she starts with bronchitis. Pt said she still has a cough. Pt uses [facility] in [place].

## 2024-01-29 ENCOUNTER — OFFICE VISIT (OUTPATIENT)
Dept: FAMILY MEDICINE CLINIC | Facility: CLINIC | Age: 59
End: 2024-01-29
Payer: COMMERCIAL

## 2024-01-29 VITALS
OXYGEN SATURATION: 99 % | TEMPERATURE: 97.7 F | HEIGHT: 60 IN | HEART RATE: 84 BPM | WEIGHT: 150 LBS | SYSTOLIC BLOOD PRESSURE: 120 MMHG | RESPIRATION RATE: 16 BRPM | DIASTOLIC BLOOD PRESSURE: 90 MMHG | BODY MASS INDEX: 29.45 KG/M2

## 2024-01-29 DIAGNOSIS — R05.1 ACUTE COUGH: Primary | ICD-10-CM

## 2024-01-29 DIAGNOSIS — B37.31 VAGINAL CANDIDIASIS: ICD-10-CM

## 2024-01-29 DIAGNOSIS — J40 BRONCHITIS: ICD-10-CM

## 2024-01-29 PROCEDURE — 99213 OFFICE O/P EST LOW 20 MIN: CPT | Performed by: STUDENT IN AN ORGANIZED HEALTH CARE EDUCATION/TRAINING PROGRAM

## 2024-01-29 RX ORDER — FLUCONAZOLE 150 MG/1
150 TABLET ORAL ONCE
Qty: 1 TABLET | Refills: 0 | Status: SHIPPED | OUTPATIENT
Start: 2024-01-29 | End: 2024-01-29

## 2024-01-29 RX ORDER — PREDNISONE 10 MG/1
TABLET ORAL DAILY
Qty: 27 TABLET | Refills: 0 | Status: SHIPPED | OUTPATIENT
Start: 2024-01-29 | End: 2024-02-08

## 2024-01-29 RX ORDER — DOXYCYCLINE HYCLATE 100 MG/1
100 CAPSULE ORAL EVERY 12 HOURS SCHEDULED
Qty: 14 CAPSULE | Refills: 0 | Status: SHIPPED | OUTPATIENT
Start: 2024-01-29 | End: 2024-02-05

## 2024-01-29 NOTE — PROGRESS NOTES
Name: Sofiya Davis      : 1965      MRN: [id number]  Encounter Provider: Theresa Bruce MD  Encounter Date: 2024   Encounter department: [facility]    Assessment & Plan     1. Acute cough  -     XR chest pa & lateral; Future; Expected date: 2024  -     predniSONE 10 mg tablet; Take 4 tablets (40 mg total) by mouth daily for 3 days, THEN 3 tablets (30 mg total) daily for 3 days, THEN 2 tablets (20 mg total) daily for 2 days, THEN 1 tablet (10 mg total) daily for 2 days.    2. Bronchitis  -     predniSONE 10 mg tablet; Take 4 tablets (40 mg total) by mouth daily for 3 days, THEN 3 tablets (30 mg total) daily for 3 days, THEN 2 tablets (20 mg total) daily for 2 days, THEN 1 tablet (10 mg total) daily for 2 days.  -     doxycycline hyclate (VIBRAMYCIN) 100 mg capsule; Take 1 capsule (100 mg total) by mouth every 12 (twelve) hours for 7 days    3. Vaginal candidiasis  -     fluconazole (DIFLUCAN) 150 mg tablet; Take 1 tablet (150 mg total) by mouth once for 1 dose       - Increase fluid intake as tolerated  -Antibiotic for full course  -Flonase OTC 1-2 sprays each nostril daily PRN for postnasal drip  -RTO in 1 week if symptoms persist or worsen    Subjective      HPI    Patient presents with persistent cough. She had COVID in Dec and traveled to [facility] shortly after. She notes she returned from her trip and continues to have a cough. She took paxlovid and a z pack earlier this month. Notes the cough keeps her up at night and she does wheeze. Albuterol inhaler helps the cough. Denies fever and chills. She is frustrated with the cough.     Review of Systems   Constitutional:  Positive for activity change. Negative for appetite change, chills, fatigue and fever.   HENT:  Positive for congestion and postnasal drip. Negative for rhinorrhea, sinus pressure, sinus pain, sneezing and sore throat.    Respiratory:  Positive for cough and wheezing. Negative for shortness of breath.     Cardiovascular:  Negative for chest pain, palpitations and leg swelling.   Gastrointestinal:  Negative for abdominal pain, constipation, diarrhea, nausea and vomiting.   Neurological:  Negative for light-headedness and headaches.   Psychiatric/Behavioral:  The patient is not nervous/anxious.        Current Outpatient Medications on File Prior to Visit   Medication Sig   • albuterol (Ventolin HFA) 90 mcg/act inhaler Inhale 2 puffs every 6 (six) hours as needed for wheezing   • benzonatate (TESSALON PERLES) 100 mg capsule Take 1 capsule (100 mg total) by mouth 3 (three) times a day as needed for cough   • fluticasone (FLONASE) 50 mcg/act nasal spray 1 spray into each nostril daily   • loperamide (IMODIUM) 2 mg capsule Take 1 capsule (2 mg total) by mouth 4 (four) times a day as needed for diarrhea   • olopatadine (PATANOL) 0.1 % ophthalmic solution Administer 1 drop to both eyes 2 (two) times a day   • [DISCONTINUED] rosuvastatin (CRESTOR) 20 MG tablet Take 1 tablet (20 mg total) by mouth daily   • neomycin-polymyxin-dexamethasone (MAXITROL) 0.35%-10,000 units/g-0.1%  (Patient not taking: Reported on 12/12/2023)       Objective     /90 (BP Location: Right arm, Patient Position: Sitting, Cuff Size: Standard)   Pulse 84   Temp 97.7 °F (36.5 °C) (Temporal)   Resp 16   Ht 5' (1.524 m)   Wt 68 kg (150 lb)   SpO2 99%   BMI 29.29 kg/m²     Physical Exam  Constitutional:       Appearance: Normal appearance.   HENT:      Head: Normocephalic and atraumatic.      Right Ear: Tympanic membrane, ear canal and external ear normal.      Left Ear: Tympanic membrane, ear canal and external ear normal.      Nose: Congestion present.      Mouth/Throat:      Pharynx: No posterior oropharyngeal erythema.   Cardiovascular:      Rate and Rhythm: Normal rate and regular rhythm.      Pulses: Normal pulses.      Heart sounds: Normal heart sounds.   Pulmonary:      Effort: Pulmonary effort is normal. No respiratory distress.       Breath sounds: Normal breath sounds. No wheezing or rhonchi.   Neurological:      General: No focal deficit present.      Mental Status: She is alert and oriented to person, place, and time.   Psychiatric:         Mood and Affect: Mood normal.         Behavior: Behavior normal.         Thought Content: Thought content normal.         Judgment: Judgment normal.       Theresa Bruce MD

## 2024-01-30 DIAGNOSIS — E78.2 MIXED HYPERLIPIDEMIA: ICD-10-CM

## 2024-01-30 RX ORDER — ROSUVASTATIN CALCIUM 20 MG/1
20 TABLET, COATED ORAL DAILY
Qty: 90 TABLET | Refills: 1 | Status: SHIPPED | OUTPATIENT
Start: 2024-01-30

## 2024-02-05 ENCOUNTER — TELEPHONE (OUTPATIENT)
Dept: FAMILY MEDICINE CLINIC | Facility: CLINIC | Age: 59
End: 2024-02-05

## 2024-02-05 NOTE — TELEPHONE ENCOUNTER
Sofiya called again to follow up on if her xray was looked at yet.   Advised that we will give her a call when  has a chance to look at results.

## 2024-02-05 NOTE — TELEPHONE ENCOUNTER
Spoke to Sofiya and relayed Dr. Barrera's message.  She will keep her appt on Thursday with Dr. Bruce. No further action required

## 2024-02-05 NOTE — TELEPHONE ENCOUNTER
Sofiya had an appt with Dr. Bruce today, but she is out.  I offered an appt with Meggan but she refuses to see a nurse practitioner .  I offered twice      She had a chest xray done and wanted to know what the results were.  Does she have pneumonia?    She has back pain and chest pain, coughing and feels wheezing.    Please advise

## 2024-02-08 ENCOUNTER — OFFICE VISIT (OUTPATIENT)
Dept: FAMILY MEDICINE CLINIC | Facility: CLINIC | Age: 59
End: 2024-02-08
Payer: COMMERCIAL

## 2024-02-08 VITALS
RESPIRATION RATE: 12 BRPM | WEIGHT: 151 LBS | DIASTOLIC BLOOD PRESSURE: 78 MMHG | OXYGEN SATURATION: 94 % | BODY MASS INDEX: 29.64 KG/M2 | TEMPERATURE: 97.9 F | HEIGHT: 60 IN | HEART RATE: 102 BPM | SYSTOLIC BLOOD PRESSURE: 110 MMHG

## 2024-02-08 DIAGNOSIS — R05.1 ACUTE COUGH: ICD-10-CM

## 2024-02-08 PROCEDURE — 99213 OFFICE O/P EST LOW 20 MIN: CPT | Performed by: STUDENT IN AN ORGANIZED HEALTH CARE EDUCATION/TRAINING PROGRAM

## 2024-02-08 RX ORDER — BENZONATATE 100 MG/1
100 CAPSULE ORAL 3 TIMES DAILY PRN
Qty: 30 CAPSULE | Refills: 1 | Status: SHIPPED | OUTPATIENT
Start: 2024-02-08

## 2024-02-08 RX ORDER — PROMETHAZINE HYDROCHLORIDE AND CODEINE PHOSPHATE 6.25; 1 MG/5ML; MG/5ML
5 SYRUP ORAL EVERY 4 HOURS PRN
Qty: 118 ML | Refills: 0 | Status: SHIPPED | OUTPATIENT
Start: 2024-02-08

## 2024-02-08 NOTE — PROGRESS NOTES
Name: Sofiya Davis      : 1965      MRN: [id number]  Encounter Provider: Theresa Bruce MD  Encounter Date: 2024   Encounter department: [facility]    Assessment & Plan     1. Acute cough  -     benzonatate (TESSALON PERLES) 100 mg capsule; Take 1 capsule (100 mg total) by mouth 3 (three) times a day as needed for cough  -     promethazine-codeine (PHENERGAN WITH CODEINE) 6.25-10 mg/5 mL syrup; Take 5 mL by mouth every 4 (four) hours as needed for cough     CXR reviewed and showed clear expanded lungs    Subjective      HPI    Patient presents for cough follow up. She notes cough has improved but she still has coughing spells at night. She is producing some clear mucous. She has one more day of prednisone and one more day of antibiotic left. She works in a school.     Review of Systems   Constitutional:  Negative for activity change, appetite change, chills, fatigue and fever.   HENT:  Positive for postnasal drip. Negative for congestion.    Respiratory:  Positive for cough. Negative for shortness of breath and wheezing.    Cardiovascular:  Negative for chest pain, palpitations and leg swelling.   Gastrointestinal:  Negative for abdominal pain, constipation, diarrhea, nausea and vomiting.   Musculoskeletal:  Negative for myalgias.   Skin:  Negative for rash.   Neurological:  Negative for weakness, light-headedness and headaches.   Psychiatric/Behavioral:  The patient is not nervous/anxious.        Current Outpatient Medications on File Prior to Visit   Medication Sig    albuterol (Ventolin HFA) 90 mcg/act inhaler Inhale 2 puffs every 6 (six) hours as needed for wheezing    fluticasone (FLONASE) 50 mcg/act nasal spray 1 spray into each nostril daily    loperamide (IMODIUM) 2 mg capsule Take 1 capsule (2 mg total) by mouth 4 (four) times a day as needed for diarrhea    olopatadine (PATANOL) 0.1 % ophthalmic solution Administer 1 drop to both eyes 2 (two) times a day     rosuvastatin (CRESTOR) 20 MG tablet TAKE 1 TABLET DAILY    neomycin-polymyxin-dexamethasone (MAXITROL) 0.35%-10,000 units/g-0.1%  (Patient not taking: Reported on 2023)    [] predniSONE 10 mg tablet Take 4 tablets (40 mg total) by mouth daily for 3 days, THEN 3 tablets (30 mg total) daily for 3 days, THEN 2 tablets (20 mg total) daily for 2 days, THEN 1 tablet (10 mg total) daily for 2 days. (Patient not taking: Reported on 2024)       Objective     /78 (BP Location: Left arm, Patient Position: Sitting, Cuff Size: Standard)   Pulse 102   Temp 97.9 °F (36.6 °C) (Temporal)   Resp 12   Ht 5' (1.524 m)   Wt 68.5 kg (151 lb)   SpO2 94%   BMI 29.49 kg/m²     Physical Exam  Constitutional:       Appearance: Normal appearance.   HENT:      Head: Normocephalic and atraumatic.   Cardiovascular:      Rate and Rhythm: Normal rate and regular rhythm.      Pulses: Normal pulses.      Heart sounds: Normal heart sounds.   Pulmonary:      Effort: Pulmonary effort is normal.      Breath sounds: Normal breath sounds. No wheezing or rhonchi.   Neurological:      General: No focal deficit present.      Mental Status: She is alert and oriented to person, place, and time.   Psychiatric:         Mood and Affect: Mood normal.         Behavior: Behavior normal.         Thought Content: Thought content normal.         Judgment: Judgment normal.       Theresa Bruce MD

## 2024-03-11 ENCOUNTER — NURSE TRIAGE (OUTPATIENT)
Age: 59
End: 2024-03-11

## 2024-03-11 NOTE — TELEPHONE ENCOUNTER
Please advise. Asking for call back after 1:10 pm  Patient declines ER. Wished to see PCP tomorrow after 2:30. Attempted to warm transfer however patient could not hold any longer on the phone. At work.     C/o back pain, between shoulder blades , that started one week ago, on Saturday started with sternal chest pain, ache about 5 on pain scale. Positional, hurts more when she belches or coughs. Still has the pain between shoulder blades. Did not try any antiacids. History of high Cholesterol.

## 2024-03-11 NOTE — TELEPHONE ENCOUNTER
Regarding: Back, chest and swallowing pain  ----- Message from Tamica Bejarano sent at 3/11/2024 11:41 AM EDT -----  Pt calling she started to have pretty bad back pain a week ago and then she said Saturday she started with some pain in her chest area, it hurts to swallow and cough.  She wanted appt but could only do after 2:30 pm- first available was this Friday, please advise if needs to be seen sooner.

## 2024-03-11 NOTE — TELEPHONE ENCOUNTER
Spoke to Sofiya.  Dr. Barrera got a cancellation this afternoon (3/11)  She is unable to make that appt.  She is scheduled for Friday.  Advised to call at 7:30 in the am to try to get any same day appts.  And I will keep an eye on the schedule if anything opens up.

## 2024-03-11 NOTE — TELEPHONE ENCOUNTER
"Reason for Disposition  • Chest pain lasting longer than 5 minutes and occurred in last 3 days (72 hours) (Exception: feels exactly the same as previously diagnosed heartburn and has accompanying sour taste in mouth)    Answer Assessment - Initial Assessment Questions  1. LOCATION: \"Where does it hurt?\"        sternal  2. RADIATION: \"Does the pain go anywhere else?\" (e.g., into neck, jaw, arms, back)      Back pain , between shoulder blades   3. ONSET: \"When did the chest pain begin?\" (Minutes, hours or days)       Saturday   4. PATTERN \"Does the pain come and go, or has it been constant since it started?\"  \"Does it get worse with exertion?\"       Comes and goes   5. DURATION: \"How long does it last\" (e.g., seconds, minutes, hours)      Hours, positional, hurts more when she burps  or coughs   6. SEVERITY: \"How bad is the pain?\"  (e.g., Scale 1-10; mild, moderate, or severe)     - MILD (1-3): doesn't interfere with normal activities      - MODERATE (4-7): interferes with normal activities or awakens from sleep     - SEVERE (8-10): excruciating pain, unable to do any normal activities        Mild to moderate  7. CARDIAC RISK FACTORS: \"Do you have any history of heart problems or risk factors for heart disease?\" (e.g., angina, prior heart attack; diabetes, high blood pressure, high cholesterol, smoker, or strong family history of heart disease)      High cholesterol  8. PULMONARY RISK FACTORS: \"Do you have any history of lung disease?\"  (e.g., blood clots in lung, asthma, emphysema, birth control pills)      Denies   9. CAUSE: \"What do you think is causing the chest pain?\"      unknown  10. OTHER SYMPTOMS: \"Do you have any other symptoms?\" (e.g., dizziness, nausea, vomiting, sweating, fever, difficulty breathing, cough)        Back pain , between the shoulder blades   11. PREGNANCY: \"Is there any chance you are pregnant?\" \"When was your last menstrual period?\"        N/a    Protocols used: Chest Pain-ADULT-[place]    "

## 2024-03-15 ENCOUNTER — OFFICE VISIT (OUTPATIENT)
Dept: FAMILY MEDICINE CLINIC | Facility: CLINIC | Age: 59
End: 2024-03-15
Payer: COMMERCIAL

## 2024-03-15 VITALS
SYSTOLIC BLOOD PRESSURE: 110 MMHG | TEMPERATURE: 97.6 F | OXYGEN SATURATION: 96 % | WEIGHT: 151.2 LBS | HEIGHT: 60 IN | HEART RATE: 76 BPM | DIASTOLIC BLOOD PRESSURE: 70 MMHG | BODY MASS INDEX: 29.68 KG/M2 | RESPIRATION RATE: 16 BRPM

## 2024-03-15 DIAGNOSIS — R07.1 CHEST PAIN ON BREATHING: Primary | ICD-10-CM

## 2024-03-15 DIAGNOSIS — R09.1 PLEURISY: ICD-10-CM

## 2024-03-15 PROCEDURE — 99213 OFFICE O/P EST LOW 20 MIN: CPT | Performed by: FAMILY MEDICINE

## 2024-03-15 NOTE — PROGRESS NOTES
Name: Sofiya Davis      : 1965      MRN: [id number]  Encounter Provider: Maldonado Barrera MD  Encounter Date: 3/15/2024   Encounter department: [facility]    Assessment & Plan     1. Chest pain on breathing    2. Pleurisy           Subjective      SEVERAL WEEKS AGO PATIENT COMPLAINED OF SHARP - CHEST AND BACK PAIN  WORSE WITH DEEP BREATHING, COUGHING OR LAUGHING  DENIED ANY PERSISTENT COUGH, CONGESTION, FEVER OR CHILLS  NOTED NO HEMOPTYSIS OR SPUTUM  NO LEG SWELLING OR PAIN WAS NOTED    SYMPTOMS HAVE RESOLVED  FEELS WELL    NO RECENT TRAUMA  WAS NOT LIFTING OR PUSHING      Review of Systems   Constitutional:  Negative for fever.   HENT:  Negative for congestion and sore throat.    Eyes:  Negative for discharge.   Respiratory:  Negative for chest tightness.    Cardiovascular:  Positive for chest pain. Negative for palpitations and leg swelling.   Gastrointestinal:  Negative for abdominal pain, diarrhea, nausea and vomiting.   Musculoskeletal:  Negative for arthralgias and joint swelling.   Neurological:  Negative for numbness.   Psychiatric/Behavioral:  The patient is not nervous/anxious.        Current Outpatient Medications on File Prior to Visit   Medication Sig   • albuterol (Ventolin HFA) 90 mcg/act inhaler Inhale 2 puffs every 6 (six) hours as needed for wheezing   • olopatadine (PATANOL) 0.1 % ophthalmic solution Administer 1 drop to both eyes 2 (two) times a day   • rosuvastatin (CRESTOR) 20 MG tablet TAKE 1 TABLET DAILY   • [DISCONTINUED] benzonatate (TESSALON PERLES) 100 mg capsule Take 1 capsule (100 mg total) by mouth 3 (three) times a day as needed for cough (Patient not taking: Reported on 3/15/2024)   • [DISCONTINUED] fluticasone (FLONASE) 50 mcg/act nasal spray 1 spray into each nostril daily (Patient not taking: Reported on 3/15/2024)   • [DISCONTINUED] loperamide (IMODIUM) 2 mg capsule Take 1 capsule (2 mg total) by mouth 4 (four) times a day as needed for  diarrhea (Patient not taking: Reported on 3/15/2024)   • [DISCONTINUED] neomycin-polymyxin-dexamethasone (MAXITROL) 0.35%-10,000 units/g-0.1%  (Patient not taking: Reported on 12/12/2023)   • [DISCONTINUED] promethazine-codeine (PHENERGAN WITH CODEINE) 6.25-10 mg/5 mL syrup Take 5 mL by mouth every 4 (four) hours as needed for cough (Patient not taking: Reported on 3/15/2024)       Objective     /70   Pulse 76   Temp 97.6 °F (36.4 °C) (Temporal)   Resp 16   Ht 5' (1.524 m)   Wt 68.6 kg (151 lb 3.2 oz)   SpO2 96%   BMI 29.53 kg/m²     Physical Exam  Constitutional:       General: She is not in acute distress.     Appearance: Normal appearance. She is well-developed and normal weight. She is not ill-appearing.   HENT:      Head: Normocephalic and atraumatic.   Eyes:      General:         Right eye: No discharge.         Left eye: No discharge.      Conjunctiva/sclera: Conjunctivae normal.      Pupils: Pupils are equal, round, and reactive to light.   Neck:      Thyroid: No thyromegaly.   Cardiovascular:      Rate and Rhythm: Normal rate and regular rhythm.      Heart sounds: Normal heart sounds. No murmur heard.  Pulmonary:      Effort: Pulmonary effort is normal. No respiratory distress.      Breath sounds: Normal breath sounds. No wheezing or rales.   Abdominal:      General: Bowel sounds are normal.      Palpations: Abdomen is soft.      Tenderness: There is no abdominal tenderness.   Musculoskeletal:         General: No tenderness. Normal range of motion.      Cervical back: Normal range of motion and neck supple.   Lymphadenopathy:      Cervical: No cervical adenopathy.   Skin:     General: Skin is warm and dry.      Findings: No erythema or rash.   Neurological:      Mental Status: She is alert and oriented to person, place, and time.   Psychiatric:         Mood and Affect: Mood normal.         Behavior: Behavior normal.         Thought Content: Thought content normal.         Judgment: Judgment  normal.       Maldonado Barrera MD

## 2024-03-18 ENCOUNTER — TELEPHONE (OUTPATIENT)
Dept: ADMINISTRATIVE | Facility: OTHER | Age: 59
End: 2024-03-18

## 2024-03-18 NOTE — LETTER
Procedure Request Form: Mammogram      Date Requested: 24  Patient: Sofiya Davis  Patient : 1965   Referring Provider: Maldonado Barrera MD        Date of Procedure _________most recent report_____________________       The above patient has informed us that they have completed their   most recent Mammogram at your facility. Please complete   this form and attach all corresponding procedure reports/results.    Comments __________________________________________________________  ____________________________________________________________________  ____________________________________________________________________  ____________________________________________________________________    Facility Completing Procedure _________________________________________    Form Completed By (print name) _______________________________________      Signature __________________________________________________________      These reports are needed for  compliance.    Please fax this completed form and a copy of the procedure report to our office located at [address] [place] [address] as soon as possible to Fax [phone] attention Shay: Phone [phone]    We thank you for your assistance in treating our mutual patient.

## 2024-03-18 NOTE — LETTER
Procedure Request Form: Cervical Cancer Screening      Date Requested: 24  Patient: Sofiya Davis  Patient : 1965   Referring Provider: Maldonado Barrera MD        Date of Procedure ___________most recent report___________________       The above patient has informed us that they have completed their   most recent Cervical Cancer Screening at your facility. Please complete   this form and attach all corresponding procedure reports/results.    Comments __________________________________________________________  ____________________________________________________________________  ____________________________________________________________________  ____________________________________________________________________    Facility Completing Procedure _________________________________________    Form Completed By (print name) _______________________________________      Signature __________________________________________________________      These reports are needed for  compliance.    Please fax this completed form and a copy of the procedure report to our office located at [address] [place] [address] as soon as possible to Fax [phone] attention Shay: Phone [phone]    We thank you for your assistance in treating our mutual patient.

## 2024-03-18 NOTE — TELEPHONE ENCOUNTER
Upon review of the In Basket request and the patient's chart, initial outreach has been made via fax to facility. Please see Contacts section for details.     Thank you  Shay Lopes MA

## 2024-03-18 NOTE — TELEPHONE ENCOUNTER
----- Message from Lilian Harris LPN sent at 3/18/2024 10:56 AM EDT -----  Regarding: [place] [facility]  03/18/24 10:56 AM    Hello, our patient attached above has had Mammogram completed/performed. Please assist in updating the patient chart by making an External outreach to [facility]  facility located in [facility]. [phone]    Thank you,  Lilian Harris [facility]    Lilian Harris LPN  P [facility]  03/18/24 10:54 AM    Hello, our patient attached above has had Pap Smear (HPV) aka Cervical Cancer Screening completed/performed. Please assist in updating the patient chart by making an External outreach to [facility] facility located in [facility]. DR. Calixto,  [phone]    Thank you,  Lilian Harris [facility]

## 2024-03-18 NOTE — LETTER
Procedure Request Form: Cervical Cancer Screening      Date Requested: 24  Patient: Sofiya Davis  Patient : 1965   Referring Provider: Maldonado Barrera MD   2nd Request        Date of Procedure __________most recent report____________________       The above patient has informed us that they have completed their   most recent Cervical Cancer Screening at your facility. Please complete   this form and attach all corresponding procedure reports/results.    Comments __________________________________________________________  ____________________________________________________________________  ____________________________________________________________________  ____________________________________________________________________    Facility Completing Procedure _________________________________________    Form Completed By (print name) _______________________________________      Signature __________________________________________________________      These reports are needed for  compliance.    Please fax this completed form and a copy of the procedure report to our office located at [address] [place] [address] as soon as possible to Fax [phone] attention Shay: Phone [phone]    We thank you for your assistance in treating our mutual patient.

## 2024-03-18 NOTE — LETTER
Procedure Request Form: Mammogram      Date Requested: 24  Patient: Sofiya Davis  Patient : 1965   Referring Provider: Maldonado Barrera MD   2nd Request        Date of Procedure _________most recent report_____________________       The above patient has informed us that they have completed their   most recent Mammogram at your facility. Please complete   this form and attach all corresponding procedure reports/results.    Comments __________________________________________________________  ____________________________________________________________________  ____________________________________________________________________  ____________________________________________________________________    Facility Completing Procedure _________________________________________    Form Completed By (print name) _______________________________________      Signature __________________________________________________________      These reports are needed for  compliance.    Please fax this completed form and a copy of the procedure report to our office located at [address] [place] [address] as soon as possible to Fax [phone] attention Shay: Phone [phone]    We thank you for your assistance in treating our mutual patient.

## 2024-03-21 NOTE — TELEPHONE ENCOUNTER
As a follow-up, a second attempt has been made for outreach via fax to facility. Please see Contacts section for details.    Thank you  Shay Lopes MA

## 2024-03-25 NOTE — TELEPHONE ENCOUNTER
As a final attempt, a third outreach has been made via telephone call to facility. Please see Contacts section for details. This encounter will be closed and completed by end of day. Should we receive the requested information because of previous outreach attempts, the requested patient's chart will be updated appropriately.     Thank you  Shay Lopes MA

## 2024-03-25 NOTE — TELEPHONE ENCOUNTER
Upon review of the In Basket request we were able to locate, review, and update the patient chart as requested for Pap Smear (HPV) aka Cervical Cancer Screening.    Any additional questions or concerns should be emailed to the Practice Liaisons via the appropriate education email address, please do not reply via In Basket.    Thank you  Shay Lopes MA

## 2024-04-09 DIAGNOSIS — E78.2 MIXED HYPERLIPIDEMIA: Primary | ICD-10-CM

## 2024-05-07 LAB
CHOLEST SERPL-MCNC: 167 MG/DL
CHOLEST/HDLC SERPL: 2.9 (CALC)
HDLC SERPL-MCNC: 57 MG/DL
LDLC SERPL CALC-MCNC: 97 MG/DL (CALC)
NONHDLC SERPL-MCNC: 110 MG/DL (CALC)
TRIGL SERPL-MCNC: 44 MG/DL

## 2024-05-15 ENCOUNTER — OFFICE VISIT (OUTPATIENT)
Dept: FAMILY MEDICINE CLINIC | Facility: CLINIC | Age: 59
End: 2024-05-15
Payer: COMMERCIAL

## 2024-05-15 VITALS
HEART RATE: 62 BPM | DIASTOLIC BLOOD PRESSURE: 62 MMHG | SYSTOLIC BLOOD PRESSURE: 106 MMHG | WEIGHT: 149 LBS | BODY MASS INDEX: 29.25 KG/M2 | RESPIRATION RATE: 12 BRPM | TEMPERATURE: 97.2 F | OXYGEN SATURATION: 96 % | HEIGHT: 60 IN

## 2024-05-15 DIAGNOSIS — I34.1 MITRAL VALVE PROLAPSE: ICD-10-CM

## 2024-05-15 DIAGNOSIS — M12.9 ARTHROPATHY: ICD-10-CM

## 2024-05-15 DIAGNOSIS — E78.5 DYSLIPIDEMIA: Primary | ICD-10-CM

## 2024-05-15 DIAGNOSIS — E78.2 MIXED HYPERLIPIDEMIA: ICD-10-CM

## 2024-05-15 PROBLEM — E55.9 VITAMIN D DEFICIENCY: Status: ACTIVE | Noted: 2024-05-15

## 2024-05-15 PROBLEM — D64.9 ANEMIA: Status: ACTIVE | Noted: 2024-05-15

## 2024-05-15 PROCEDURE — 99214 OFFICE O/P EST MOD 30 MIN: CPT | Performed by: FAMILY MEDICINE

## 2024-05-15 RX ORDER — ROSUVASTATIN CALCIUM 10 MG/1
10 TABLET, COATED ORAL DAILY
Qty: 90 TABLET | Refills: 1 | Status: SHIPPED | OUTPATIENT
Start: 2024-05-15

## 2024-05-15 NOTE — LETTER
LISA      Current Outpatient Medications:     albuterol (Ventolin HFA) 90 mcg/act inhaler, Inhale 2 puffs every 6 (six) hours as needed for wheezing, Disp: 18 g, Rfl: 5    olopatadine (PATANOL) 0.1 % ophthalmic solution, Administer 1 drop to both eyes 2 (two) times a day, Disp: 5 mL, Rfl: 3    rosuvastatin (CRESTOR) 20 MG tablet, TAKE 1 TABLET DAILY, Disp: 90 tablet, Rfl: 1    Recent Results (from the past 672 hour(s))   Lipid panel    Collection Time: 05/06/24  7:02 AM   Result Value Ref Range    Total Cholesterol 167 <200 mg/dL    HDL 57 > OR = 50 mg/dL    Triglycerides 44 <150 mg/dL    LDL Calculated 97 mg/dL (calc)    Chol HDLC Ratio 2.9 <5.0 (calc)    Non-HDL Cholesterol 110 <130 mg/dL (calc)

## 2024-05-15 NOTE — PROGRESS NOTES
Ambulatory Visit  Name: Sofiya Davis      : 1965      MRN: [id number]  Encounter Provider: Maldonado Barrera MD  Encounter Date: 5/15/2024   Encounter department: [facility]    Assessment & Plan   1. Dyslipidemia  Assessment & Plan:  WATCHING CHOLESTEROL INTAKE  COMPLIANT WITH MEDICATION  NO CONCERNS    - CONTINUE TO MONITOR DIETARY CHOL INTAKE  - CONTINUE CURRENT MEDICATION  - ENCOURAGED PHYSICAL ACTIVITY    2. Mitral valve prolapse  Assessment & Plan:  STABLE  3. Arthropathy  Assessment & Plan:  STABLE  DENIES ANY JOINT SWELLING OR REDNESS  JOINT STIFFNESS PRESENT  PAIN MANAGEMENT ADEQUATE    - CONTINUE CURRENT MANAGEMENT  - MEDICATION AS DIRECTED  - CALL / RETURN IF SYMPTOMS WORSEN         History of Present Illness     PATIENT RETURNS FOR ROUTINE EVALUATION OF PATIENT'S MEDICAL ISSUES    INDIVIDUAL MEDICAL ISSUES WITH THEIR CURRENT STATUS, ASSESSMENT AND PLANS ARE LISTED ABOVE              Review of Systems   Constitutional:  Negative for chills, fatigue and fever.   HENT:  Negative for congestion, ear discharge, ear pain, mouth sores, postnasal drip, sore throat and trouble swallowing.    Eyes:  Negative for pain, discharge and visual disturbance.   Respiratory:  Negative for cough, shortness of breath and wheezing.    Cardiovascular:  Negative for chest pain, palpitations and leg swelling.   Gastrointestinal:  Negative for abdominal distention, abdominal pain, blood in stool, diarrhea and nausea.   Endocrine: Negative for polydipsia, polyphagia and polyuria.   Genitourinary:  Negative for dysuria, frequency, hematuria and urgency.   Musculoskeletal:  Negative for arthralgias, gait problem and joint swelling.   Skin:  Negative for pallor and rash.   Neurological:  Negative for dizziness, syncope, speech difficulty, weakness, light-headedness, numbness and headaches.   Hematological:  Negative for adenopathy.   Psychiatric/Behavioral:  Negative for behavioral problems,  confusion and sleep disturbance. The patient is not nervous/anxious.        Objective     /62 (BP Location: Left arm, Patient Position: Sitting, Cuff Size: Large)   Pulse 62   Temp (!) 97.2 °F (36.2 °C) (Temporal)   Resp 12   Ht 5' (1.524 m)   Wt 67.6 kg (149 lb)   SpO2 96%   BMI 29.10 kg/m²     Physical Exam  Constitutional:       General: She is not in acute distress.     Appearance: Normal appearance. She is well-developed and normal weight. She is not ill-appearing.   HENT:      Head: Normocephalic and atraumatic.      Right Ear: External ear normal.      Left Ear: External ear normal.      Nose: Nose normal.      Mouth/Throat:      Mouth: Mucous membranes are moist.      Pharynx: No oropharyngeal exudate.   Eyes:      General: No scleral icterus.        Right eye: No discharge.         Left eye: No discharge.      Conjunctiva/sclera: Conjunctivae normal.      Pupils: Pupils are equal, round, and reactive to light.   Neck:      Thyroid: No thyromegaly.      Trachea: No tracheal deviation.   Cardiovascular:      Rate and Rhythm: Normal rate and regular rhythm.      Heart sounds: Normal heart sounds. No murmur heard.     No friction rub. No gallop.   Pulmonary:      Effort: Pulmonary effort is normal. No respiratory distress.      Breath sounds: Normal breath sounds. No wheezing or rales.   Chest:      Chest wall: No tenderness.   Abdominal:      General: Bowel sounds are normal. There is no distension.      Palpations: Abdomen is soft. There is no mass.      Tenderness: There is no abdominal tenderness. There is no guarding or rebound.      Hernia: No hernia is present.   Musculoskeletal:         General: No tenderness or deformity. Normal range of motion.      Cervical back: Normal range of motion and neck supple.   Lymphadenopathy:      Cervical: No cervical adenopathy.   Skin:     General: Skin is warm and dry.      Findings: No erythema or rash.   Neurological:      General: No focal deficit  present.      Mental Status: She is alert and oriented to person, place, and time.      Cranial Nerves: No cranial nerve deficit.      Sensory: No sensory deficit.      Motor: No weakness or abnormal muscle tone.      Coordination: Coordination normal.      Gait: Gait normal.      Deep Tendon Reflexes: Reflexes normal.   Psychiatric:         Mood and Affect: Mood normal.         Behavior: Behavior normal.         Thought Content: Thought content normal.         Judgment: Judgment normal.       Administrative Statements

## 2024-05-15 NOTE — PATIENT INSTRUCTIONS
CONTINUE CURRENT TREATMENT PLAN  MONITOR DIETARY SODIUM, CHOL INTAKE  ENCOURAGE PHYSICAL ACTIVITY      RV 6 M, SOONER PRN      Recent Results (from the past 672 hour(s))   Lipid panel    Collection Time: 05/06/24  7:02 AM   Result Value Ref Range    Total Cholesterol 167 <200 mg/dL    HDL 57 > OR = 50 mg/dL    Triglycerides 44 <150 mg/dL    LDL Calculated 97 mg/dL (calc)    Chol HDLC Ratio 2.9 <5.0 (calc)    Non-HDL Cholesterol 110 <130 mg/dL (calc)

## 2024-07-18 ENCOUNTER — OFFICE VISIT (OUTPATIENT)
Dept: FAMILY MEDICINE CLINIC | Facility: CLINIC | Age: 59
End: 2024-07-18
Payer: COMMERCIAL

## 2024-07-18 VITALS
SYSTOLIC BLOOD PRESSURE: 110 MMHG | RESPIRATION RATE: 14 BRPM | HEART RATE: 81 BPM | HEIGHT: 60 IN | TEMPERATURE: 97.3 F | BODY MASS INDEX: 29.06 KG/M2 | WEIGHT: 148 LBS | OXYGEN SATURATION: 97 % | DIASTOLIC BLOOD PRESSURE: 70 MMHG

## 2024-07-18 DIAGNOSIS — B35.2 TINEA MANUUM: Primary | ICD-10-CM

## 2024-07-18 DIAGNOSIS — M79.602 LEFT ARM PAIN: ICD-10-CM

## 2024-07-18 PROCEDURE — 99213 OFFICE O/P EST LOW 20 MIN: CPT | Performed by: STUDENT IN AN ORGANIZED HEALTH CARE EDUCATION/TRAINING PROGRAM

## 2024-07-18 RX ORDER — KETOCONAZOLE 20 MG/G
CREAM TOPICAL DAILY
Qty: 60 G | Refills: 1 | Status: SHIPPED | OUTPATIENT
Start: 2024-07-18

## 2024-07-18 RX ORDER — PREDNISONE 10 MG/1
TABLET ORAL
Qty: 27 TABLET | Refills: 0 | Status: SHIPPED | OUTPATIENT
Start: 2024-07-18 | End: 2024-07-28

## 2024-07-18 NOTE — PROGRESS NOTES
Ambulatory Visit  Name: Sofiya Davis      : 1965      MRN: [id number]  Encounter Provider: Theresa Bruce MD  Encounter Date: 2024   Encounter department: [facility]    Assessment & Plan   1. Tinea manuum  -     ketoconazole (NIZORAL) 2 % cream; Apply topically daily  2. Left arm pain  -     predniSONE 10 mg tablet; Take 4 tablets (40 mg total) by mouth daily for 3 days, THEN 3 tablets (30 mg total) daily for 3 days, THEN 2 tablets (20 mg total) daily for 2 days, THEN 1 tablet (10 mg total) daily for 2 days.       History of Present Illness     HPI    Patient reports she has a small rash on right hand that is getting bigger. She notes it is slightly itchy. She has dry skin. She notes her left forearm has pain shooting up her upper arm. She was kneading dough and since then the pain begun. She has not taken anything  for the pain.       Review of Systems   Constitutional:  Negative for activity change, chills, diaphoresis, fatigue and fever.   HENT:  Negative for congestion, postnasal drip, rhinorrhea and sore throat.    Respiratory:  Negative for cough, shortness of breath and wheezing.    Cardiovascular:  Negative for chest pain, palpitations and leg swelling.   Gastrointestinal:  Negative for abdominal pain, constipation, diarrhea, nausea and vomiting.   Musculoskeletal:  Positive for arthralgias. Negative for myalgias.   Skin:  Positive for color change and rash.   Neurological:  Negative for weakness, light-headedness and headaches.   Psychiatric/Behavioral:  The patient is not nervous/anxious.        Objective     /70 (BP Location: Left arm, Patient Position: Sitting, Cuff Size: Large)   Pulse 81   Temp (!) 97.3 °F (36.3 °C) (Temporal)   Resp 14   Ht 5' (1.524 m)   Wt 67.1 kg (148 lb)   SpO2 97%   BMI 28.90 kg/m²     Physical Exam  Constitutional:       Appearance: Normal appearance.   HENT:      Head: Normocephalic and atraumatic.   Cardiovascular:       Rate and Rhythm: Normal rate and regular rhythm.      Pulses: Normal pulses.      Heart sounds: Normal heart sounds.   Pulmonary:      Effort: Pulmonary effort is normal.      Breath sounds: Normal breath sounds.   Musculoskeletal:      Right forearm: Normal.      Left forearm: Tenderness present.   Skin:     Comments: Pea sized erythematous dry scaling rash on hand   Neurological:      General: No focal deficit present.      Mental Status: She is alert and oriented to person, place, and time.   Psychiatric:         Mood and Affect: Mood normal.         Behavior: Behavior normal.         Thought Content: Thought content normal.         Judgment: Judgment normal.       Administrative Statements

## 2024-07-19 ENCOUNTER — TELEPHONE (OUTPATIENT)
Dept: ADMINISTRATIVE | Facility: OTHER | Age: 59
End: 2024-07-19

## 2024-07-19 NOTE — LETTER
Procedure Request Form: Mammogram      Date Requested: 24  Patient: Sofiya Davis  Patient : 1965   Referring Provider: Maldonado Barrera MD        Date of Procedure ______________________________       The above patient has informed us that they have completed their   most recent Mammogram at your facility. Please complete   this form and attach all corresponding procedure reports/results.    Comments __________________________________________________________  ____________________________________________________________________  ____________________________________________________________________  ____________________________________________________________________    Facility Completing Procedure _________________________________________    Form Completed By (print name) _______________________________________      Signature __________________________________________________________      These reports are needed for  compliance.    Please fax this completed form and a copy of the procedure report to our office located at [address] [place] [address] as soon as possible to Fax [phone] attention Patricia: Phone [phone]    We thank you for your assistance in treating our mutual patient.

## 2024-07-19 NOTE — TELEPHONE ENCOUNTER
----- Message from Héctor DREW sent at 7/18/2024  2:57 PM EDT -----  Regarding: CARE G[facility] REQUST - Mammo  07/18/24 2:57 PM    Hello, our patient attached above has had Mammogram completed/performed. Please assist in updating the patient chart by making an External outreach to [facility] facility located in [place], [place]. The date of service is LAST AVAILABLE.    Thank you,  Héctor Colon, CMA  [facility]

## 2024-07-22 NOTE — TELEPHONE ENCOUNTER
Upon review of the In Basket request and the patient's chart, initial outreach has been made via fax to facility. Please see Contacts section for details.     Thank you  Patricia Vasquez MA

## 2024-07-25 NOTE — TELEPHONE ENCOUNTER
As a final attempt, a third outreach has been made via telephone call to facility. Please see Contacts section for details. This encounter will be closed and completed by end of day. Should we receive the requested information because of previous outreach attempts, the requested patient's chart will be updated appropriately.     Thank you  Patricia Vasquez MA     I've called 3 different places in [place] - found a mammo from 2023  faxing it over to office today

## 2024-07-29 NOTE — TELEPHONE ENCOUNTER
"Upon review of the In Basket request we have found as a result of outreach that patient did not have the requested item(s) completed.  We need corrected info as to where she had mammo completed.  Reached out to 4 different locations, all came back \"NOT OUR PT.\"    Any additional questions or concerns should be emailed to the Practice Liaisons via the appropriate education email address, please do not reply via In Basket.    Thank you  Patricia Vasquez MA   [facility]          "

## 2024-10-16 DIAGNOSIS — D64.9 ANEMIA, UNSPECIFIED TYPE: ICD-10-CM

## 2024-10-16 DIAGNOSIS — E78.5 DYSLIPIDEMIA: ICD-10-CM

## 2024-10-16 DIAGNOSIS — Z13.820 SCREENING FOR OSTEOPOROSIS: ICD-10-CM

## 2024-10-16 DIAGNOSIS — Z00.00 ROUTINE GENERAL MEDICAL EXAMINATION AT A HEALTH CARE FACILITY: Primary | ICD-10-CM

## 2024-10-16 DIAGNOSIS — E55.9 VITAMIN D DEFICIENCY: ICD-10-CM

## 2024-10-16 DIAGNOSIS — Z13.29 SCREENING FOR HYPOTHYROIDISM: ICD-10-CM

## 2024-10-24 DIAGNOSIS — E78.2 MIXED HYPERLIPIDEMIA: ICD-10-CM

## 2024-10-24 RX ORDER — ROSUVASTATIN CALCIUM 10 MG/1
10 TABLET, COATED ORAL DAILY
Qty: 90 TABLET | Refills: 1 | Status: SHIPPED | OUTPATIENT
Start: 2024-10-24

## 2024-11-01 LAB
25(OH)D3 SERPL-MCNC: 29 NG/ML (ref 30–100)
ALBUMIN SERPL-MCNC: 4.5 G/DL (ref 3.6–5.1)
ALBUMIN/GLOB SERPL: 1.9 (CALC) (ref 1–2.5)
ALP SERPL-CCNC: 63 U/L (ref 37–153)
ALT SERPL-CCNC: 18 U/L (ref 6–29)
AST SERPL-CCNC: 15 U/L (ref 10–35)
BASOPHILS # BLD AUTO: 52 CELLS/UL (ref 0–200)
BASOPHILS NFR BLD AUTO: 0.8 %
BILIRUB SERPL-MCNC: 0.9 MG/DL (ref 0.2–1.2)
BUN SERPL-MCNC: 17 MG/DL (ref 7–25)
BUN/CREAT SERPL: NORMAL (CALC) (ref 6–22)
CALCIUM SERPL-MCNC: 9.8 MG/DL (ref 8.6–10.4)
CHLORIDE SERPL-SCNC: 103 MMOL/L (ref 98–110)
CHOLEST SERPL-MCNC: 202 MG/DL
CHOLEST/HDLC SERPL: 3.6 (CALC)
CO2 SERPL-SCNC: 29 MMOL/L (ref 20–32)
CREAT SERPL-MCNC: 0.59 MG/DL (ref 0.5–1.03)
EOSINOPHIL # BLD AUTO: 241 CELLS/UL (ref 15–500)
EOSINOPHIL NFR BLD AUTO: 3.7 %
ERYTHROCYTE [DISTWIDTH] IN BLOOD BY AUTOMATED COUNT: 12.4 % (ref 11–15)
GFR/BSA.PRED SERPLBLD CYS-BASED-ARV: 105 ML/MIN/1.73M2
GLOBULIN SER CALC-MCNC: 2.4 G/DL (CALC) (ref 1.9–3.7)
GLUCOSE SERPL-MCNC: 87 MG/DL (ref 65–99)
HCT VFR BLD AUTO: 40.8 % (ref 35–45)
HDLC SERPL-MCNC: 56 MG/DL
HGB BLD-MCNC: 13.4 G/DL (ref 11.7–15.5)
LDLC SERPL CALC-MCNC: 127 MG/DL (CALC)
LYMPHOCYTES # BLD AUTO: 1957 CELLS/UL (ref 850–3900)
LYMPHOCYTES NFR BLD AUTO: 30.1 %
MCH RBC QN AUTO: 30.7 PG (ref 27–33)
MCHC RBC AUTO-ENTMCNC: 32.8 G/DL (ref 32–36)
MCV RBC AUTO: 93.6 FL (ref 80–100)
MONOCYTES # BLD AUTO: 423 CELLS/UL (ref 200–950)
MONOCYTES NFR BLD AUTO: 6.5 %
NEUTROPHILS # BLD AUTO: 3829 CELLS/UL (ref 1500–7800)
NEUTROPHILS NFR BLD AUTO: 58.9 %
NONHDLC SERPL-MCNC: 146 MG/DL (CALC)
PLATELET # BLD AUTO: 302 THOUSAND/UL (ref 140–400)
PMV BLD REES-ECKER: 10.2 FL (ref 7.5–12.5)
POTASSIUM SERPL-SCNC: 4.4 MMOL/L (ref 3.5–5.3)
PROT SERPL-MCNC: 6.9 G/DL (ref 6.1–8.1)
RBC # BLD AUTO: 4.36 MILLION/UL (ref 3.8–5.1)
SODIUM SERPL-SCNC: 138 MMOL/L (ref 135–146)
TRIGL SERPL-MCNC: 89 MG/DL
TSH SERPL-ACNC: 1.83 MIU/L (ref 0.4–4.5)
WBC # BLD AUTO: 6.5 THOUSAND/UL (ref 3.8–10.8)

## 2024-11-19 ENCOUNTER — OFFICE VISIT (OUTPATIENT)
Dept: FAMILY MEDICINE CLINIC | Facility: CLINIC | Age: 59
End: 2024-11-19
Payer: COMMERCIAL

## 2024-11-19 VITALS
BODY MASS INDEX: 30.43 KG/M2 | HEIGHT: 60 IN | DIASTOLIC BLOOD PRESSURE: 78 MMHG | TEMPERATURE: 97.8 F | OXYGEN SATURATION: 98 % | RESPIRATION RATE: 16 BRPM | HEART RATE: 87 BPM | WEIGHT: 155 LBS | SYSTOLIC BLOOD PRESSURE: 116 MMHG

## 2024-11-19 DIAGNOSIS — Z13.29 SCREENING FOR HYPOTHYROIDISM: ICD-10-CM

## 2024-11-19 DIAGNOSIS — Z11.59 NEED FOR HEPATITIS B SCREENING TEST: ICD-10-CM

## 2024-11-19 DIAGNOSIS — Z13.6 SCREENING FOR HYPERTENSION: ICD-10-CM

## 2024-11-19 DIAGNOSIS — Z13.820 SCREENING FOR OSTEOPOROSIS: ICD-10-CM

## 2024-11-19 DIAGNOSIS — Z12.31 ENCOUNTER FOR SCREENING MAMMOGRAM FOR BREAST CANCER: ICD-10-CM

## 2024-11-19 DIAGNOSIS — Z00.00 ROUTINE GENERAL MEDICAL EXAMINATION AT A HEALTH CARE FACILITY: Primary | ICD-10-CM

## 2024-11-19 DIAGNOSIS — I34.1 MITRAL VALVE PROLAPSE: ICD-10-CM

## 2024-11-19 DIAGNOSIS — Z78.0 ASYMPTOMATIC MENOPAUSE: ICD-10-CM

## 2024-11-19 DIAGNOSIS — Z01.84 IMMUNITY STATUS TESTING: ICD-10-CM

## 2024-11-19 DIAGNOSIS — E78.5 DYSLIPIDEMIA: ICD-10-CM

## 2024-11-19 DIAGNOSIS — D64.9 ANEMIA, UNSPECIFIED TYPE: ICD-10-CM

## 2024-11-19 DIAGNOSIS — E55.9 VITAMIN D DEFICIENCY: ICD-10-CM

## 2024-11-19 DIAGNOSIS — J45.20 MILD INTERMITTENT ASTHMA WITHOUT COMPLICATION: ICD-10-CM

## 2024-11-19 PROCEDURE — 99396 PREV VISIT EST AGE 40-64: CPT | Performed by: FAMILY MEDICINE

## 2024-11-19 RX ORDER — TRAMADOL HYDROCHLORIDE 50 MG/1
TABLET, COATED ORAL EVERY 6 HOURS
COMMUNITY
Start: 2024-09-23 | End: 2024-11-19 | Stop reason: ALTCHOICE

## 2024-11-19 NOTE — LETTER
MAGAN ORO      Current Outpatient Medications:     Ultram 50 MG tablet, Take by mouth every 6 (six) hours, Disp: , Rfl:     albuterol (Ventolin HFA) 90 mcg/act inhaler, Inhale 2 puffs every 6 (six) hours as needed for wheezing, Disp: 18 g, Rfl: 5    ketoconazole (NIZORAL) 2 % cream, Apply topically daily, Disp: 60 g, Rfl: 1    olopatadine (PATANOL) 0.1 % ophthalmic solution, Administer 1 drop to both eyes 2 (two) times a day, Disp: 5 mL, Rfl: 3    rosuvastatin (CRESTOR) 10 MG tablet, TAKE 1 TABLET DAILY, Disp: 90 tablet, Rfl: 1    Recent Results (from the past 16 weeks)   Comprehensive metabolic panel    Collection Time: 10/31/24  7:04 AM   Result Value Ref Range    Glucose, Random 87 65 - 99 mg/dL    BUN 17 7 - 25 mg/dL    Creatinine 0.59 0.50 - 1.03 mg/dL    eGFR 105 > OR = 60 mL/min/1.73m2    SL AMB BUN/CREATININE RATIO SEE NOTE: 6 - 22 (calc)    Sodium 138 135 - 146 mmol/L    Potassium 4.4 3.5 - 5.3 mmol/L    Chloride 103 98 - 110 mmol/L    CO2 29 20 - 32 mmol/L    Calcium 9.8 8.6 - 10.4 mg/dL    Protein, Total 6.9 6.1 - 8.1 g/dL    Albumin 4.5 3.6 - 5.1 g/dL    Globulin 2.4 1.9 - 3.7 g/dL (calc)    Albumin/Globulin Ratio 1.9 1.0 - 2.5 (calc)    TOTAL BILIRUBIN 0.9 0.2 - 1.2 mg/dL    Alkaline Phosphatase 63 37 - 153 U/L    AST 15 10 - 35 U/L    ALT 18 6 - 29 U/L   CBC and differential    Collection Time: 10/31/24  7:04 AM   Result Value Ref Range    White Blood Cell Count 6.5 3.8 - 10.8 Thousand/uL    Red Blood Cell Count 4.36 3.80 - 5.10 Million/uL    Hemoglobin 13.4 11.7 - 15.5 g/dL    HCT 40.8 35.0 - 45.0 %    MCV 93.6 80.0 - 100.0 fL    MCH 30.7 27.0 - 33.0 pg    MCHC 32.8 32.0 - 36.0 g/dL    RDW 12.4 11.0 - 15.0 %    Platelet Count 302 140 - 400 Thousand/uL    SL AMB MPV 10.2 7.5 - 12.5 fL    Neutrophils (Absolute) 3,829 1,500 - 7,800 cells/uL    Lymphocytes (Absolute) 1,957 850 - 3,900 cells/uL    Monocytes (Absolute) 423 200 - 950 cells/uL    Eosinophils (Absolute) 241 15 - 500 cells/uL     Basophils ABS 52 0 - 200 cells/uL    Neutrophils 58.9 %    Lymphocytes 30.1 %    Monocytes 6.5 %    Eosinophils 3.7 %    Basophils PCT 0.8 %   Lipid panel    Collection Time: 10/31/24  7:04 AM   Result Value Ref Range    Total Cholesterol 202 (H) <200 mg/dL    HDL 56 > OR = 50 mg/dL    Triglycerides 89 <150 mg/dL    LDL Calculated 127 (H) mg/dL (calc)    Chol HDLC Ratio 3.6 <5.0 (calc)    Non-HDL Cholesterol 146 (H) <130 mg/dL (calc)   TSH, 3rd generation    Collection Time: 10/31/24  7:04 AM   Result Value Ref Range    TSH 1.83 0.40 - 4.50 mIU/L   Vitamin D 25 hydroxy    Collection Time: 10/31/24  7:04 AM   Result Value Ref Range    Vitamin D, 25-Hydroxy, Serum 29 (L) 30 - 100 ng/mL

## 2024-11-19 NOTE — PATIENT INSTRUCTIONS
DISCUSSED HEALTH ISSUES  HEALTHY DIET AND EXERCISE  BW WILL BE REVIEWED  MAMMOGRAPHY   RECOMMEND CALCIUM 2595-8057 MG DAILY  VITAMIN D3  1000 IU DAILY  RV IN 1 YEAR FOR ANNUAL EXAM, SOONER IF NEEDED    RV 6M      Recent Results (from the past 16 weeks)   Comprehensive metabolic panel    Collection Time: 10/31/24  7:04 AM   Result Value Ref Range    Glucose, Random 87 65 - 99 mg/dL    BUN 17 7 - 25 mg/dL    Creatinine 0.59 0.50 - 1.03 mg/dL    eGFR 105 > OR = 60 mL/min/1.73m2    SL AMB BUN/CREATININE RATIO SEE NOTE: 6 - 22 (calc)    Sodium 138 135 - 146 mmol/L    Potassium 4.4 3.5 - 5.3 mmol/L    Chloride 103 98 - 110 mmol/L    CO2 29 20 - 32 mmol/L    Calcium 9.8 8.6 - 10.4 mg/dL    Protein, Total 6.9 6.1 - 8.1 g/dL    Albumin 4.5 3.6 - 5.1 g/dL    Globulin 2.4 1.9 - 3.7 g/dL (calc)    Albumin/Globulin Ratio 1.9 1.0 - 2.5 (calc)    TOTAL BILIRUBIN 0.9 0.2 - 1.2 mg/dL    Alkaline Phosphatase 63 37 - 153 U/L    AST 15 10 - 35 U/L    ALT 18 6 - 29 U/L   CBC and differential    Collection Time: 10/31/24  7:04 AM   Result Value Ref Range    White Blood Cell Count 6.5 3.8 - 10.8 Thousand/uL    Red Blood Cell Count 4.36 3.80 - 5.10 Million/uL    Hemoglobin 13.4 11.7 - 15.5 g/dL    HCT 40.8 35.0 - 45.0 %    MCV 93.6 80.0 - 100.0 fL    MCH 30.7 27.0 - 33.0 pg    MCHC 32.8 32.0 - 36.0 g/dL    RDW 12.4 11.0 - 15.0 %    Platelet Count 302 140 - 400 Thousand/uL    SL AMB MPV 10.2 7.5 - 12.5 fL    Neutrophils (Absolute) 3,829 1,500 - 7,800 cells/uL    Lymphocytes (Absolute) 1,957 850 - 3,900 cells/uL    Monocytes (Absolute) 423 200 - 950 cells/uL    Eosinophils (Absolute) 241 15 - 500 cells/uL    Basophils ABS 52 0 - 200 cells/uL    Neutrophils 58.9 %    Lymphocytes 30.1 %    Monocytes 6.5 %    Eosinophils 3.7 %    Basophils PCT 0.8 %   Lipid panel    Collection Time: 10/31/24  7:04 AM   Result Value Ref Range    Total Cholesterol 202 (H) <200 mg/dL    HDL 56 > OR = 50 mg/dL    Triglycerides 89 <150 mg/dL    LDL Calculated 127  (H) mg/dL (calc)    Chol HDLC Ratio 3.6 <5.0 (calc)    Non-HDL Cholesterol 146 (H) <130 mg/dL (calc)   TSH, 3rd generation    Collection Time: 10/31/24  7:04 AM   Result Value Ref Range    TSH 1.83 0.40 - 4.50 mIU/L   Vitamin D 25 hydroxy    Collection Time: 10/31/24  7:04 AM   Result Value Ref Range    Vitamin D, 25-Hydroxy, Serum 29 (L) 30 - 100 ng/mL

## 2024-11-19 NOTE — PROGRESS NOTES
Depression Screening and Follow-up Plan: Patient was screened for depression during today's encounter. They screened negative with a PHQ-2 score of 0.    FAMILY PRACTICE HEALTH MAINTENANCE OFFICE VISIT  [facility] - [facility]    NAME: Sofiya Davis  AGE: 59 y.o. SEX: female  : 1965     DATE: 2024    Assessment and Plan     Problem List Items Addressed This Visit          Cardiovascular and Mediastinum    Mitral valve prolapse       Respiratory    Mild intermittent asthma without complication       Blood    Anemia       Other    Dyslipidemia    Relevant Orders    Comprehensive metabolic panel    Lipid panel    Hepatitis B surface antibody    Vitamin D deficiency     Other Visit Diagnoses         Routine general medical examination at a health care facility    -  Primary      Screening for hypertension          Screening for hypothyroidism          Encounter for screening mammogram for breast cancer        Relevant Orders    Mammo screening bilateral w 3d and cad      Screening for osteoporosis          Asymptomatic menopause          Need for hepatitis B screening test        Relevant Orders    Hepatitis B surface antibody      Immunity status testing        Relevant Orders    Hepatitis A antibody, total                 Return in about 6 months (around 2025) for Recheck.        Chief Complaint     Chief Complaint   Patient presents with    Physical Exam     Annual physical exam RH       History of Present Illness     DISCUSSED HEALTH ISSUES  REVIEWED MEDICAL RECORD  NO CONCERNS AT THIS TIME            Well Adult Physical   Patient here for a comprehensive physical exam.      Diet and Physical Activity  Diet: well balanced diet  Weight concerns: Patient has class 1 obesity (BMI 30-34.9)  Exercise: intermittently      Depression Screen  PHQ-2/9 Depression Screening    Little interest or pleasure in doing things: 0 - not at all  Feeling down, depressed, or  hopeless: 0 - not at all  PHQ-2 Score: 0  PHQ-2 Interpretation: Negative depression screen          General Health  Hearing: Normal:  bilateral  Vision: no vision problems  Dental: regular dental visits    Reproductive Health          The following portions of the patient's history were reviewed and updated as appropriate: allergies, current medications, past family history, past medical history, past social history, past surgical history and problem list.    Review of Systems     Review of Systems   Constitutional:  Negative for chills, fatigue and fever.   HENT:  Negative for congestion, ear discharge, ear pain, mouth sores, postnasal drip, sore throat and trouble swallowing.    Eyes:  Negative for pain, discharge and visual disturbance.   Respiratory:  Negative for cough, shortness of breath and wheezing.    Cardiovascular:  Negative for chest pain, palpitations and leg swelling.   Gastrointestinal:  Negative for abdominal distention, abdominal pain, blood in stool, diarrhea and nausea.   Endocrine: Negative for polydipsia, polyphagia and polyuria.   Genitourinary:  Negative for dysuria, frequency, hematuria and urgency.   Musculoskeletal:  Negative for arthralgias, gait problem and joint swelling.   Skin:  Negative for pallor and rash.   Neurological:  Negative for dizziness, syncope, speech difficulty, weakness, light-headedness, numbness and headaches.   Hematological:  Negative for adenopathy.   Psychiatric/Behavioral:  Negative for behavioral problems, confusion and sleep disturbance. The patient is not nervous/anxious.        Past Medical History     Past Medical History:   Diagnosis Date    Hyperlipidemia        Past Surgical History     History reviewed. No pertinent surgical history.    Social History     Social History     Socioeconomic History    Marital status: /Civil Union     Spouse name: None    Number of children: None    Years of education: None    Highest education level: None    Occupational History    None   Tobacco Use    Smoking status: Never     Passive exposure: Never    Smokeless tobacco: Never   Vaping Use    Vaping status: Never Used   Substance and Sexual Activity    Alcohol use: Yes     Comment: rare    Drug use: Never    Sexual activity: None   Other Topics Concern    None   Social History Narrative    None     Social Drivers of Health     Financial Resource Strain: Not on file   Food Insecurity: Not on file   Transportation Needs: Not on file   Physical Activity: Not on file   Stress: Not on file   Social Connections: Not on file   Intimate Partner Violence: Not At Risk (3/3/2024)    [facility]    Domestic Violence Screen     Physical Abuse Risk: Not At Risk     Verbal Abuse Risk: Not At Risk   Housing Stability: Not on file       Family History     Family History   Problem Relation Age of Onset    Heart disease Mother     Hyperlipidemia Mother     Alzheimer's disease Father     Substance Abuse Brother     Mental illness Neg Hx        Current Medications       Current Outpatient Medications:     albuterol (Ventolin HFA) 90 mcg/act inhaler, Inhale 2 puffs every 6 (six) hours as needed for wheezing, Disp: 18 g, Rfl: 5    ketoconazole (NIZORAL) 2 % cream, Apply topically daily, Disp: 60 g, Rfl: 1    olopatadine (PATANOL) 0.1 % ophthalmic solution, Administer 1 drop to both eyes 2 (two) times a day, Disp: 5 mL, Rfl: 3    rosuvastatin (CRESTOR) 10 MG tablet, TAKE 1 TABLET DAILY, Disp: 90 tablet, Rfl: 1     Allergies     Allergies   Allergen Reactions    Ibuprofen Shortness Of Breath, Throat Swelling and Anaphylaxis    Pollen Extract Hives       Objective     /78 (BP Location: Left arm, Patient Position: Sitting, Cuff Size: Standard)   Pulse 87   Temp 97.8 °F (36.6 °C) (Temporal)   Resp 16   Ht 5' (1.524 m)   Wt 70.3 kg (155 lb)   SpO2 98%   BMI 30.27 kg/m²      Physical Exam  Vitals reviewed.   Constitutional:        General: She is not in acute distress.     Appearance: Normal appearance. She is well-developed. She is not ill-appearing.   HENT:      Head: Normocephalic and atraumatic.      Right Ear: Tympanic membrane and external ear normal.      Left Ear: Tympanic membrane and external ear normal.      Nose: Nose normal.      Mouth/Throat:      Mouth: Mucous membranes are moist.   Eyes:      General:         Right eye: No discharge.         Left eye: No discharge.      Conjunctiva/sclera: Conjunctivae normal.      Pupils: Pupils are equal, round, and reactive to light.   Neck:      Thyroid: No thyromegaly.   Cardiovascular:      Rate and Rhythm: Normal rate and regular rhythm.      Heart sounds: Normal heart sounds. No murmur heard.  Pulmonary:      Effort: Pulmonary effort is normal.      Breath sounds: Normal breath sounds. No wheezing or rales.   Abdominal:      General: Bowel sounds are normal. There is no distension.      Palpations: Abdomen is soft. There is no mass.      Tenderness: There is no abdominal tenderness. There is no guarding or rebound.   Musculoskeletal:         General: No tenderness or deformity. Normal range of motion.      Cervical back: Normal range of motion and neck supple.   Lymphadenopathy:      Cervical: No cervical adenopathy.   Skin:     General: Skin is warm and dry.      Findings: No erythema or rash.   Neurological:      General: No focal deficit present.      Mental Status: She is alert and oriented to person, place, and time.      Cranial Nerves: No cranial nerve deficit.      Sensory: No sensory deficit.      Motor: No weakness or abnormal muscle tone.      Coordination: Coordination normal.      Gait: Gait normal.      Deep Tendon Reflexes: Reflexes are normal and symmetric. Reflexes normal.   Psychiatric:         Mood and Affect: Mood normal.         Behavior: Behavior normal.         Thought Content: Thought content normal.         Judgment: Judgment normal.           No results  found.    Health Maintenance     Health Maintenance   Topic Date Due    DTaP,Tdap,and Td Vaccines (1 - Tdap) Never done    Colorectal Cancer Screening  Never done    Zoster Vaccine (1 of 2) Never done    Influenza Vaccine (1) Never done    COVID-19 Vaccine (4 - 2024-25 season) 09/01/2024    Annual Physical  11/15/2024    Breast Cancer Screening: Mammogram  10/26/2024    Depression Screening  11/19/2025    Cervical Cancer Screening  11/20/2028    RSV Vaccine Age 60+ Years (1 - 1-dose 75+ series) 03/22/2042    HIV Screening  Completed    Hepatitis C Screening  Completed    RSV Vaccine age 0-20 Months  Aged Out    Pneumococcal Vaccine: Pediatrics (0 to 5 Years) and At-Risk Patients (6 to 64 Years)  Aged Out    HIB Vaccine  Aged Out    IPV Vaccine  Aged Out    Hepatitis A Vaccine  Aged Out    Meningococcal ACWY Vaccine  Aged Out    HPV Vaccine  Aged Out     Immunization History   Administered Date(s) Administered    COVID-19 PFIZER VACCINE 0.3 ML IM 03/08/2021, 03/27/2021, 11/12/2021       Maldonado Barrera MD  [facility]

## 2025-01-03 ENCOUNTER — OFFICE VISIT (OUTPATIENT)
Dept: FAMILY MEDICINE CLINIC | Facility: CLINIC | Age: 60
End: 2025-01-03
Payer: COMMERCIAL

## 2025-01-03 VITALS
WEIGHT: 158 LBS | DIASTOLIC BLOOD PRESSURE: 70 MMHG | RESPIRATION RATE: 16 BRPM | OXYGEN SATURATION: 98 % | SYSTOLIC BLOOD PRESSURE: 120 MMHG | HEART RATE: 92 BPM | BODY MASS INDEX: 31.02 KG/M2 | TEMPERATURE: 98.1 F | HEIGHT: 60 IN

## 2025-01-03 DIAGNOSIS — R05.1 ACUTE COUGH: Primary | ICD-10-CM

## 2025-01-03 DIAGNOSIS — B37.31 VAGINAL CANDIDIASIS: ICD-10-CM

## 2025-01-03 DIAGNOSIS — M79.672 LEFT FOOT PAIN: ICD-10-CM

## 2025-01-03 DIAGNOSIS — J45.20 MILD INTERMITTENT ASTHMA WITHOUT COMPLICATION: ICD-10-CM

## 2025-01-03 PROCEDURE — 99213 OFFICE O/P EST LOW 20 MIN: CPT | Performed by: STUDENT IN AN ORGANIZED HEALTH CARE EDUCATION/TRAINING PROGRAM

## 2025-01-03 RX ORDER — FLUCONAZOLE 150 MG/1
150 TABLET ORAL ONCE
Qty: 1 TABLET | Refills: 0 | Status: SHIPPED | OUTPATIENT
Start: 2025-01-03 | End: 2025-01-03

## 2025-01-03 RX ORDER — PREDNISONE 10 MG/1
TABLET ORAL
Qty: 27 TABLET | Refills: 0 | Status: SHIPPED | OUTPATIENT
Start: 2025-01-03 | End: 2025-01-13

## 2025-01-03 NOTE — PROGRESS NOTES
Name: Sofiya Davis      : 1965      MRN: [id number]  Encounter Provider: Theresa Bruce MD  Encounter Date: 1/3/2025   Encounter department: [facility]  :  Assessment & Plan  Acute cough    Orders:  •  predniSONE 10 mg tablet; Take 4 tablets (40 mg total) by mouth daily for 3 days, THEN 3 tablets (30 mg total) daily for 3 days, THEN 2 tablets (20 mg total) daily for 2 days, THEN 1 tablet (10 mg total) daily for 2 days.    Left foot pain    Orders:  •  Ambulatory Referral to Physical Therapy; Future    Mild intermittent asthma without complication         Vaginal candidiasis    Orders:  •  fluconazole (DIFLUCAN) 150 mg tablet; Take 1 tablet (150 mg total) by mouth once for 1 dose           History of Present Illness     HPI    Patient reports she has pain in her left side and cough. This has been present for 2-3 weeks. She also notes left foot pain and would like to try physical therapy. She notes when she is on prednisone she tends to get yeast infections.     Review of Systems   Constitutional:  Negative for activity change, chills, diaphoresis, fatigue and fever.   HENT:  Negative for congestion, postnasal drip, rhinorrhea and sore throat.    Respiratory:  Positive for cough. Negative for shortness of breath and wheezing.    Cardiovascular:  Negative for chest pain, palpitations and leg swelling.   Gastrointestinal:  Negative for abdominal pain, constipation, diarrhea, nausea and vomiting.   Musculoskeletal:  Negative for myalgias.   Skin:  Negative for rash.   Neurological:  Negative for weakness, light-headedness and headaches.   Psychiatric/Behavioral:  The patient is not nervous/anxious.        Objective   /70 (BP Location: Left arm, Patient Position: Sitting, Cuff Size: Large)   Pulse 92   Temp 98.1 °F (36.7 °C) (Temporal)   Resp 16   Ht 5' (1.524 m)   Wt 71.7 kg (158 lb)   SpO2 98%   BMI 30.86 kg/m²      Physical Exam  Constitutional:       Appearance: Normal  appearance.   HENT:      Head: Normocephalic and atraumatic.   Cardiovascular:      Rate and Rhythm: Normal rate and regular rhythm.      Pulses: Normal pulses.      Heart sounds: Normal heart sounds.   Pulmonary:      Effort: Pulmonary effort is normal.      Breath sounds: Normal breath sounds.   Neurological:      General: No focal deficit present.      Mental Status: She is alert and oriented to person, place, and time.   Psychiatric:         Mood and Affect: Mood normal.         Behavior: Behavior normal.         Thought Content: Thought content normal.         Judgment: Judgment normal.

## 2025-01-15 ENCOUNTER — NURSE TRIAGE (OUTPATIENT)
Dept: FAMILY MEDICINE CLINIC | Facility: CLINIC | Age: 60
End: 2025-01-15

## 2025-01-15 ENCOUNTER — TELEPHONE (OUTPATIENT)
Age: 60
End: 2025-01-15

## 2025-01-15 DIAGNOSIS — J06.9 UPPER RESPIRATORY TRACT INFECTION, UNSPECIFIED TYPE: Primary | ICD-10-CM

## 2025-01-15 NOTE — TELEPHONE ENCOUNTER
predniSONE 10 mg tablet    Patient wanted to add to her previous message that she never used the prednisone when she picked it up because she thought she was feeling better.    Please call patient back if something else is called in for her.  Thank you

## 2025-01-15 NOTE — TELEPHONE ENCOUNTER
Patient was seen on 1/3 for a cough and prescribed prednisone as per the office note.  Patient's , Zack, called and stated the patient's cough has not improved and she is now experiencing upper back pain.  She did not take the medication prescribed as she felt it would improve without it.  Zack stated they were told to call if symptoms did not improve to possibly discuss an antibiotic being prescribed.  He would like to know what would be recommended.  The patient is at work, but will be available after 1 pm as they are having an early dismissal today.  Zack is also available today on his mobile.  Please advise.  Thank you!

## 2025-01-15 NOTE — TELEPHONE ENCOUNTER
Pt called in request that medication be sent to the [email or web address] pharmacy today. She stated she has a cough that won't go away still and needs antibiotics as well

## 2025-01-20 NOTE — TELEPHONE ENCOUNTER
Regarding: medication problem  ----- Message from Simi SAUNDERS sent at 1/15/2025 11:37 AM EST -----  Pt called in request that medication be sent to the [facility] pharmacy today. She stated she has a cough that won't go away still and needs antibiotics as well

## 2025-04-22 DIAGNOSIS — E78.2 MIXED HYPERLIPIDEMIA: ICD-10-CM

## 2025-04-22 RX ORDER — ROSUVASTATIN CALCIUM 10 MG/1
10 TABLET, COATED ORAL DAILY
Qty: 90 TABLET | Refills: 1 | Status: SHIPPED | OUTPATIENT
Start: 2025-04-22

## 2025-05-14 LAB
ALBUMIN SERPL-MCNC: 4.4 G/DL (ref 3.6–5.1)
ALBUMIN/GLOB SERPL: 1.8 (CALC) (ref 1–2.5)
ALP SERPL-CCNC: 62 U/L (ref 37–153)
ALT SERPL-CCNC: 19 U/L (ref 6–29)
AST SERPL-CCNC: 16 U/L (ref 10–35)
BILIRUB SERPL-MCNC: 0.6 MG/DL (ref 0.2–1.2)
BUN SERPL-MCNC: 21 MG/DL (ref 7–25)
BUN/CREAT SERPL: NORMAL (CALC) (ref 6–22)
CALCIUM SERPL-MCNC: 9.6 MG/DL (ref 8.6–10.4)
CHLORIDE SERPL-SCNC: 106 MMOL/L (ref 98–110)
CHOLEST SERPL-MCNC: 236 MG/DL
CHOLEST/HDLC SERPL: 4.4 (CALC)
CO2 SERPL-SCNC: 29 MMOL/L (ref 20–32)
CREAT SERPL-MCNC: 0.56 MG/DL (ref 0.5–1.03)
GFR/BSA.PRED SERPLBLD CYS-BASED-ARV: 106 ML/MIN/1.73M2
GLOBULIN SER CALC-MCNC: 2.5 G/DL (CALC) (ref 1.9–3.7)
GLUCOSE SERPL-MCNC: 97 MG/DL (ref 65–99)
HBV SURFACE AB SERPL IA-ACNC: <5 MIU/ML
HDLC SERPL-MCNC: 54 MG/DL
LDLC SERPL CALC-MCNC: 163 MG/DL (CALC)
NONHDLC SERPL-MCNC: 182 MG/DL (CALC)
POTASSIUM SERPL-SCNC: 4.4 MMOL/L (ref 3.5–5.3)
PROT SERPL-MCNC: 6.9 G/DL (ref 6.1–8.1)
SODIUM SERPL-SCNC: 141 MMOL/L (ref 135–146)
TRIGL SERPL-MCNC: 88 MG/DL

## 2025-05-20 ENCOUNTER — OFFICE VISIT (OUTPATIENT)
Dept: FAMILY MEDICINE CLINIC | Facility: CLINIC | Age: 60
End: 2025-05-20
Payer: COMMERCIAL

## 2025-05-20 VITALS
SYSTOLIC BLOOD PRESSURE: 118 MMHG | DIASTOLIC BLOOD PRESSURE: 70 MMHG | WEIGHT: 159.4 LBS | RESPIRATION RATE: 16 BRPM | HEART RATE: 81 BPM | OXYGEN SATURATION: 98 % | HEIGHT: 60 IN | BODY MASS INDEX: 31.29 KG/M2 | TEMPERATURE: 98.1 F

## 2025-05-20 DIAGNOSIS — E78.5 DYSLIPIDEMIA: ICD-10-CM

## 2025-05-20 DIAGNOSIS — Z01.84 IMMUNITY STATUS TESTING: ICD-10-CM

## 2025-05-20 DIAGNOSIS — D64.9 ANEMIA, UNSPECIFIED TYPE: ICD-10-CM

## 2025-05-20 DIAGNOSIS — E78.2 MIXED HYPERLIPIDEMIA: ICD-10-CM

## 2025-05-20 DIAGNOSIS — J45.20 MILD INTERMITTENT ASTHMA WITHOUT COMPLICATION: Primary | ICD-10-CM

## 2025-05-20 PROCEDURE — 99214 OFFICE O/P EST MOD 30 MIN: CPT | Performed by: FAMILY MEDICINE

## 2025-05-20 RX ORDER — ROSUVASTATIN CALCIUM 20 MG/1
20 TABLET, COATED ORAL DAILY
Qty: 90 TABLET | Refills: 3 | Status: SHIPPED | OUTPATIENT
Start: 2025-05-20 | End: 2025-05-21 | Stop reason: SDUPTHER

## 2025-05-20 NOTE — PATIENT INSTRUCTIONS
CONTINUE CURRENT TREATMENT PLAN  MONITOR DIETARY SODIUM, CHOL INTAKE  ENCOURAGE PHYSICAL ACTIVITY      RV 6 M, SOONER PRN      Recent Results (from the past 4 weeks)   Comprehensive metabolic panel    Collection Time: 05/13/25  7:08 AM   Result Value Ref Range    Glucose, Random 97 65 - 99 mg/dL    BUN 21 7 - 25 mg/dL    Creatinine 0.56 0.50 - 1.03 mg/dL    eGFR 106 > OR = 60 mL/min/1.73m2    SL AMB BUN/CREATININE RATIO SEE NOTE: 6 - 22 (calc)    Sodium 141 135 - 146 mmol/L    Potassium 4.4 3.5 - 5.3 mmol/L    Chloride 106 98 - 110 mmol/L    CO2 29 20 - 32 mmol/L    Calcium 9.6 8.6 - 10.4 mg/dL    Protein, Total 6.9 6.1 - 8.1 g/dL    Albumin 4.4 3.6 - 5.1 g/dL    Globulin 2.5 1.9 - 3.7 g/dL (calc)    Albumin/Globulin Ratio 1.8 1.0 - 2.5 (calc)    TOTAL BILIRUBIN 0.6 0.2 - 1.2 mg/dL    Alkaline Phosphatase 62 37 - 153 U/L    AST 16 10 - 35 U/L    ALT 19 6 - 29 U/L   Lipid panel    Collection Time: 05/13/25  7:08 AM   Result Value Ref Range    Total Cholesterol 236 (H) <200 mg/dL    HDL 54 > OR = 50 mg/dL    Triglycerides 88 <150 mg/dL    LDL Calculated 163 (H) mg/dL (calc)    Chol HDLC Ratio 4.4 <5.0 (calc)    Non-HDL Cholesterol 182 (H) <130 mg/dL (calc)   Hepatitis B surface antibody    Collection Time: 05/13/25  7:08 AM   Result Value Ref Range    Hepatitis B Surface Antibody Immunity, QN <5 (L) > OR = 10 mIU/mL

## 2025-05-20 NOTE — TELEPHONE ENCOUNTER
Reminder:  Sofiya states you changed her Crestor 10mg to 20mg.  Please send in 20mg to [facility]

## 2025-05-20 NOTE — LETTER
MAGAN ORO      Current Outpatient Medications:     albuterol (Ventolin HFA) 90 mcg/act inhaler, Inhale 2 puffs every 6 (six) hours as needed for wheezing, Disp: 18 g, Rfl: 5    ketoconazole (NIZORAL) 2 % cream, Apply topically daily, Disp: 60 g, Rfl: 1    olopatadine (PATANOL) 0.1 % ophthalmic solution, Administer 1 drop to both eyes 2 (two) times a day, Disp: 5 mL, Rfl: 3    rosuvastatin (CRESTOR) 10 MG tablet, TAKE 1 TABLET DAILY, Disp: 90 tablet, Rfl: 1    Recent Results (from the past 4 weeks)   Comprehensive metabolic panel    Collection Time: 05/13/25  7:08 AM   Result Value Ref Range    Glucose, Random 97 65 - 99 mg/dL    BUN 21 7 - 25 mg/dL    Creatinine 0.56 0.50 - 1.03 mg/dL    eGFR 106 > OR = 60 mL/min/1.73m2    SL AMB BUN/CREATININE RATIO SEE NOTE: 6 - 22 (calc)    Sodium 141 135 - 146 mmol/L    Potassium 4.4 3.5 - 5.3 mmol/L    Chloride 106 98 - 110 mmol/L    CO2 29 20 - 32 mmol/L    Calcium 9.6 8.6 - 10.4 mg/dL    Protein, Total 6.9 6.1 - 8.1 g/dL    Albumin 4.4 3.6 - 5.1 g/dL    Globulin 2.5 1.9 - 3.7 g/dL (calc)    Albumin/Globulin Ratio 1.8 1.0 - 2.5 (calc)    TOTAL BILIRUBIN 0.6 0.2 - 1.2 mg/dL    Alkaline Phosphatase 62 37 - 153 U/L    AST 16 10 - 35 U/L    ALT 19 6 - 29 U/L   Lipid panel    Collection Time: 05/13/25  7:08 AM   Result Value Ref Range    Total Cholesterol 236 (H) <200 mg/dL    HDL 54 > OR = 50 mg/dL    Triglycerides 88 <150 mg/dL    LDL Calculated 163 (H) mg/dL (calc)    Chol HDLC Ratio 4.4 <5.0 (calc)    Non-HDL Cholesterol 182 (H) <130 mg/dL (calc)   Hepatitis B surface antibody    Collection Time: 05/13/25  7:08 AM   Result Value Ref Range    Hepatitis B Surface Antibody Immunity, QN <5 (L) > OR = 10 mIU/mL

## 2025-05-21 ENCOUNTER — TELEPHONE (OUTPATIENT)
Dept: FAMILY MEDICINE CLINIC | Facility: CLINIC | Age: 60
End: 2025-05-21

## 2025-05-21 RX ORDER — ROSUVASTATIN CALCIUM 20 MG/1
20 TABLET, COATED ORAL DAILY
Qty: 90 TABLET | Refills: 3 | Status: SHIPPED | OUTPATIENT
Start: 2025-05-21

## 2025-05-21 NOTE — PROGRESS NOTES
Name: Sofiya Davis      : 1965      MRN: [id number]  Encounter Provider: Maldonado Barrera MD  Encounter Date: 2025   Encounter department: [facility]    Assessment & Plan  Mild intermittent asthma without complication  STABLE       Dyslipidemia  WATCHING CHOLESTEROL INTAKE  COMPLIANT WITH MEDICATION  NO CONCERNS    - CONTINUE TO MONITOR DIETARY CHOL INTAKE  - CONTINUE CURRENT MEDICATION  - ENCOURAGED PHYSICAL ACTIVITY         Anemia, unspecified type             Mixed hyperlipidemia    Orders:  •  rosuvastatin (CRESTOR) 20 MG tablet; Take 1 tablet (20 mg total) by mouth daily    Immunity status testing    Orders:  •  Measles/Mumps/Rubella Immunity  •  Varicella zoster antibody, IgG; Future      Depression Screening and Follow-up Plan: Patient was screened for depression during today's encounter. They screened negative with a PHQ-2 score of 0.          History of Present Illness     PATIENT RETURNS FOR ROUTINE EVALUATION OF PATIENT'S MEDICAL ISSUES    INDIVIDUAL MEDICAL ISSUES WITH THEIR CURRENT STATUS, ASSESSMENT AND PLANS ARE LISTED ABOVE            Review of Systems   Constitutional:  Negative for chills, fatigue and fever.   HENT:  Negative for congestion, ear discharge, ear pain, mouth sores, postnasal drip, sore throat and trouble swallowing.    Eyes:  Negative for pain, discharge and visual disturbance.   Respiratory:  Negative for cough, shortness of breath and wheezing.    Cardiovascular:  Negative for chest pain, palpitations and leg swelling.   Gastrointestinal:  Negative for abdominal distention, abdominal pain, blood in stool, diarrhea and nausea.   Endocrine: Negative for polydipsia, polyphagia and polyuria.   Genitourinary:  Negative for dysuria, frequency, hematuria and urgency.   Musculoskeletal:  Negative for arthralgias, gait problem and joint swelling.   Skin:  Negative for pallor and rash.   Neurological:  Negative for dizziness, syncope, speech  difficulty, weakness, light-headedness, numbness and headaches.   Hematological:  Negative for adenopathy.   Psychiatric/Behavioral:  Negative for behavioral problems, confusion and sleep disturbance. The patient is not nervous/anxious.      Past Medical History:   Diagnosis Date   • Hyperlipidemia      History reviewed. No pertinent surgical history.  Family History   Problem Relation Age of Onset   • Heart disease Mother    • Hyperlipidemia Mother    • Alzheimer's disease Father    • Substance Abuse Brother    • Mental illness Neg Hx      Social History     Tobacco Use   • Smoking status: Never     Passive exposure: Never   • Smokeless tobacco: Never   Vaping Use   • Vaping status: Never Used   Substance and Sexual Activity   • Alcohol use: Yes     Comment: rare   • Drug use: Never   • Sexual activity: Not on file     Medications[1]  Allergies   Allergen Reactions   • Ibuprofen Shortness Of Breath, Throat Swelling and Anaphylaxis   • Pollen Extract Hives     Immunization History   Administered Date(s) Administered   • COVID-19 PFIZER VACCINE 0.3 ML IM 03/08/2021, 03/27/2021, 11/12/2021     Objective   /70   Pulse 81   Temp 98.1 °F (36.7 °C)   Resp 16   Ht 5' (1.524 m)   Wt 72.3 kg (159 lb 6.4 oz)   SpO2 98%   BMI 31.13 kg/m²     Physical Exam  Constitutional:       Appearance: She is well-developed.   HENT:      Head: Normocephalic and atraumatic.      Nose: Nose normal.      Mouth/Throat:      Mouth: Mucous membranes are moist.     Eyes:      General:         Right eye: No discharge.         Left eye: No discharge.      Conjunctiva/sclera: Conjunctivae normal.      Pupils: Pupils are equal, round, and reactive to light.     Neck:      Thyroid: No thyromegaly.     Cardiovascular:      Rate and Rhythm: Normal rate and regular rhythm.      Heart sounds: Normal heart sounds. No murmur heard.  Pulmonary:      Effort: Pulmonary effort is normal. No respiratory distress.      Breath sounds: Normal breath  sounds. No wheezing or rales.   Abdominal:      General: Bowel sounds are normal.      Palpations: Abdomen is soft.      Tenderness: There is no abdominal tenderness.     Musculoskeletal:         General: No tenderness. Normal range of motion.      Cervical back: Normal range of motion and neck supple.   Lymphadenopathy:      Cervical: No cervical adenopathy.     Skin:     General: Skin is warm and dry.      Findings: No erythema or rash.     Neurological:      General: No focal deficit present.      Mental Status: She is alert and oriented to person, place, and time.     Psychiatric:         Behavior: Behavior normal.         Thought Content: Thought content normal.         Judgment: Judgment normal.                [1]  Current Outpatient Medications on File Prior to Visit   Medication Sig   • albuterol (Ventolin HFA) 90 mcg/act inhaler Inhale 2 puffs every 6 (six) hours as needed for wheezing   • olopatadine (PATANOL) 0.1 % ophthalmic solution Administer 1 drop to both eyes 2 (two) times a day   • [DISCONTINUED] ketoconazole (NIZORAL) 2 % cream Apply topically daily (Patient not taking: Reported on 5/20/2025)

## 2025-05-21 NOTE — TELEPHONE ENCOUNTER
Reminder: Syedemory states she would like to start PT at [facility] in June as she discussed with you at her appt yersterday.  [facility]of[phone].    (There is an order in the system from Dr Bruce from 1/2025, but wasn't sure if that was ok to send)

## 2025-05-27 DIAGNOSIS — M79.673 PAIN OF FOOT, UNSPECIFIED LATERALITY: Primary | ICD-10-CM
